# Patient Record
Sex: MALE | Race: WHITE | NOT HISPANIC OR LATINO | Employment: OTHER | URBAN - NONMETROPOLITAN AREA
[De-identification: names, ages, dates, MRNs, and addresses within clinical notes are randomized per-mention and may not be internally consistent; named-entity substitution may affect disease eponyms.]

---

## 2020-11-24 ENCOUNTER — LAB (OUTPATIENT)
Dept: LAB | Facility: HOSPITAL | Age: 60
End: 2020-11-24

## 2020-11-24 ENCOUNTER — OFFICE VISIT (OUTPATIENT)
Dept: INTERNAL MEDICINE | Facility: CLINIC | Age: 60
End: 2020-11-24

## 2020-11-24 VITALS
SYSTOLIC BLOOD PRESSURE: 132 MMHG | WEIGHT: 177 LBS | HEART RATE: 59 BPM | DIASTOLIC BLOOD PRESSURE: 88 MMHG | TEMPERATURE: 98 F | OXYGEN SATURATION: 96 %

## 2020-11-24 DIAGNOSIS — Z99.89 OSA ON CPAP: ICD-10-CM

## 2020-11-24 DIAGNOSIS — Z00.00 ENCOUNTER FOR PREVENTIVE CARE: ICD-10-CM

## 2020-11-24 DIAGNOSIS — I10 ESSENTIAL HYPERTENSION: ICD-10-CM

## 2020-11-24 DIAGNOSIS — Z00.00 ENCOUNTER FOR PREVENTIVE CARE: Primary | ICD-10-CM

## 2020-11-24 DIAGNOSIS — Z11.59 ENCOUNTER FOR HEPATITIS C SCREENING TEST FOR LOW RISK PATIENT: ICD-10-CM

## 2020-11-24 DIAGNOSIS — M19.032 ARTHRITIS OF LEFT WRIST: ICD-10-CM

## 2020-11-24 DIAGNOSIS — Z12.5 PROSTATE CANCER SCREENING: ICD-10-CM

## 2020-11-24 DIAGNOSIS — N13.8 BPH WITH OBSTRUCTION/LOWER URINARY TRACT SYMPTOMS: ICD-10-CM

## 2020-11-24 DIAGNOSIS — G47.33 OSA ON CPAP: ICD-10-CM

## 2020-11-24 DIAGNOSIS — E78.5 HYPERLIPIDEMIA, UNSPECIFIED HYPERLIPIDEMIA TYPE: ICD-10-CM

## 2020-11-24 DIAGNOSIS — Z13.31 DEPRESSION SCREEN: ICD-10-CM

## 2020-11-24 DIAGNOSIS — N40.1 BPH WITH OBSTRUCTION/LOWER URINARY TRACT SYMPTOMS: ICD-10-CM

## 2020-11-24 LAB
25(OH)D3 SERPL-MCNC: 35.4 NG/ML (ref 30–100)
ALBUMIN SERPL-MCNC: 4.8 G/DL (ref 3.5–5.2)
ALBUMIN/GLOB SERPL: 2 G/DL
ALP SERPL-CCNC: 53 U/L (ref 39–117)
ALT SERPL W P-5'-P-CCNC: 17 U/L (ref 1–41)
ANION GAP SERPL CALCULATED.3IONS-SCNC: 11.6 MMOL/L (ref 5–15)
AST SERPL-CCNC: 20 U/L (ref 1–40)
BASOPHILS # BLD AUTO: 0.05 10*3/MM3 (ref 0–0.2)
BASOPHILS NFR BLD AUTO: 1.1 % (ref 0–1.5)
BILIRUB SERPL-MCNC: 0.5 MG/DL (ref 0–1.2)
BUN SERPL-MCNC: 11 MG/DL (ref 8–23)
BUN/CREAT SERPL: 11.1 (ref 7–25)
CALCIUM SPEC-SCNC: 9.8 MG/DL (ref 8.6–10.5)
CHLORIDE SERPL-SCNC: 101 MMOL/L (ref 98–107)
CHOLEST SERPL-MCNC: 141 MG/DL (ref 0–200)
CO2 SERPL-SCNC: 30.4 MMOL/L (ref 22–29)
CREAT SERPL-MCNC: 0.99 MG/DL (ref 0.76–1.27)
DEPRECATED RDW RBC AUTO: 39.8 FL (ref 37–54)
EOSINOPHIL # BLD AUTO: 0.17 10*3/MM3 (ref 0–0.4)
EOSINOPHIL NFR BLD AUTO: 3.7 % (ref 0.3–6.2)
ERYTHROCYTE [DISTWIDTH] IN BLOOD BY AUTOMATED COUNT: 11.7 % (ref 12.3–15.4)
GFR SERPL CREATININE-BSD FRML MDRD: 77 ML/MIN/1.73
GLOBULIN UR ELPH-MCNC: 2.4 GM/DL
GLUCOSE SERPL-MCNC: 95 MG/DL (ref 65–99)
HBA1C MFR BLD: 4.9 % (ref 4.8–5.6)
HCT VFR BLD AUTO: 44.4 % (ref 37.5–51)
HCV AB SER DONR QL: NORMAL
HDLC SERPL-MCNC: 58 MG/DL (ref 40–60)
HGB BLD-MCNC: 15.2 G/DL (ref 13–17.7)
IMM GRANULOCYTES # BLD AUTO: 0.02 10*3/MM3 (ref 0–0.05)
IMM GRANULOCYTES NFR BLD AUTO: 0.4 % (ref 0–0.5)
LDLC SERPL CALC-MCNC: 67 MG/DL (ref 0–100)
LDLC/HDLC SERPL: 1.14 {RATIO}
LYMPHOCYTES # BLD AUTO: 1.25 10*3/MM3 (ref 0.7–3.1)
LYMPHOCYTES NFR BLD AUTO: 27.4 % (ref 19.6–45.3)
MCH RBC QN AUTO: 31.9 PG (ref 26.6–33)
MCHC RBC AUTO-ENTMCNC: 34.2 G/DL (ref 31.5–35.7)
MCV RBC AUTO: 93.3 FL (ref 79–97)
MONOCYTES # BLD AUTO: 0.51 10*3/MM3 (ref 0.1–0.9)
MONOCYTES NFR BLD AUTO: 11.2 % (ref 5–12)
NEUTROPHILS NFR BLD AUTO: 2.56 10*3/MM3 (ref 1.7–7)
NEUTROPHILS NFR BLD AUTO: 56.2 % (ref 42.7–76)
NRBC BLD AUTO-RTO: 0 /100 WBC (ref 0–0.2)
PLATELET # BLD AUTO: 199 10*3/MM3 (ref 140–450)
PMV BLD AUTO: 10.9 FL (ref 6–12)
POTASSIUM SERPL-SCNC: 4 MMOL/L (ref 3.5–5.2)
PROT SERPL-MCNC: 7.2 G/DL (ref 6–8.5)
PSA SERPL-MCNC: 1.11 NG/ML (ref 0–4)
RBC # BLD AUTO: 4.76 10*6/MM3 (ref 4.14–5.8)
SODIUM SERPL-SCNC: 143 MMOL/L (ref 136–145)
TRIGL SERPL-MCNC: 83 MG/DL (ref 0–150)
VLDLC SERPL-MCNC: 16 MG/DL (ref 5–40)
WBC # BLD AUTO: 4.56 10*3/MM3 (ref 3.4–10.8)

## 2020-11-24 PROCEDURE — 86803 HEPATITIS C AB TEST: CPT

## 2020-11-24 PROCEDURE — 80061 LIPID PANEL: CPT

## 2020-11-24 PROCEDURE — 99386 PREV VISIT NEW AGE 40-64: CPT | Performed by: INTERNAL MEDICINE

## 2020-11-24 PROCEDURE — G0103 PSA SCREENING: HCPCS

## 2020-11-24 PROCEDURE — 36415 COLL VENOUS BLD VENIPUNCTURE: CPT

## 2020-11-24 PROCEDURE — 80053 COMPREHEN METABOLIC PANEL: CPT

## 2020-11-24 PROCEDURE — 82306 VITAMIN D 25 HYDROXY: CPT

## 2020-11-24 PROCEDURE — 83036 HEMOGLOBIN GLYCOSYLATED A1C: CPT

## 2020-11-24 PROCEDURE — 85025 COMPLETE CBC W/AUTO DIFF WBC: CPT

## 2020-11-24 RX ORDER — HYDROCHLOROTHIAZIDE 25 MG/1
25 TABLET ORAL DAILY
Qty: 90 TABLET | Refills: 2 | Status: SHIPPED | OUTPATIENT
Start: 2020-11-24 | End: 2021-12-17

## 2020-11-24 RX ORDER — SIMVASTATIN 20 MG
20 TABLET ORAL NIGHTLY
Qty: 90 TABLET | Refills: 2 | Status: SHIPPED | OUTPATIENT
Start: 2020-11-24 | End: 2021-09-20

## 2020-11-24 RX ORDER — TAMSULOSIN HYDROCHLORIDE 0.4 MG/1
2 CAPSULE ORAL DAILY
COMMUNITY
End: 2021-07-15

## 2020-11-24 RX ORDER — CETIRIZINE HYDROCHLORIDE 10 MG/1
10 TABLET ORAL DAILY
COMMUNITY

## 2020-11-24 RX ORDER — SIMVASTATIN 20 MG
20 TABLET ORAL NIGHTLY
COMMUNITY
End: 2020-11-24 | Stop reason: SDUPTHER

## 2020-11-24 RX ORDER — HYDROCHLOROTHIAZIDE 25 MG/1
25 TABLET ORAL DAILY
COMMUNITY
End: 2020-11-24 | Stop reason: SDUPTHER

## 2020-12-01 ENCOUNTER — LAB REQUISITION (OUTPATIENT)
Dept: LAB | Facility: HOSPITAL | Age: 60
End: 2020-12-01

## 2020-12-01 DIAGNOSIS — Z00.00 ROUTINE GENERAL MEDICAL EXAMINATION AT A HEALTH CARE FACILITY: ICD-10-CM

## 2020-12-01 PROCEDURE — 87635 SARS-COV-2 COVID-19 AMP PRB: CPT

## 2020-12-02 LAB — SARS-COV-2 N GENE RESP QL NAA+PROBE: NOT DETECTED

## 2021-01-08 RX ORDER — TAMSULOSIN HYDROCHLORIDE 0.4 MG/1
0.4 CAPSULE ORAL DAILY
COMMUNITY

## 2021-01-08 RX ORDER — HYDROCHLOROTHIAZIDE 25 MG/1
25 TABLET ORAL DAILY
COMMUNITY

## 2021-01-08 RX ORDER — SIMVASTATIN 20 MG
20 TABLET ORAL NIGHTLY
COMMUNITY

## 2021-01-10 ENCOUNTER — OFFICE VISIT (OUTPATIENT)
Age: 61
End: 2021-01-10

## 2021-01-10 VITALS — HEART RATE: 77 BPM | OXYGEN SATURATION: 97 % | TEMPERATURE: 98 F

## 2021-01-10 DIAGNOSIS — Z11.59 SCREENING FOR VIRAL DISEASE: Primary | ICD-10-CM

## 2021-01-10 PROCEDURE — 99999 PR OFFICE/OUTPT VISIT,PROCEDURE ONLY: CPT | Performed by: NURSE PRACTITIONER

## 2021-01-11 ENCOUNTER — HOSPITAL ENCOUNTER (OUTPATIENT)
Dept: NON INVASIVE DIAGNOSTICS | Age: 61
Discharge: HOME | End: 2021-01-11
Payer: COMMERCIAL

## 2021-01-11 DIAGNOSIS — Z01.818 PRE-OP TESTING: ICD-10-CM

## 2021-01-11 LAB
EKG P AXIS: 50 DEGREES
EKG P-R INTERVAL: 170 MS
EKG Q-T INTERVAL: 400 MS
EKG QRS DURATION: 86 MS
EKG QTC CALCULATION (BAZETT): 405 MS
EKG T AXIS: 20 DEGREES
SARS-COV-2, PCR: NOT DETECTED

## 2021-01-12 ENCOUNTER — ANESTHESIA EVENT (OUTPATIENT)
Dept: OPERATING ROOM | Age: 61
End: 2021-01-12

## 2021-01-14 ENCOUNTER — ANESTHESIA (OUTPATIENT)
Dept: OPERATING ROOM | Age: 61
End: 2021-01-14

## 2021-01-14 ENCOUNTER — HOSPITAL ENCOUNTER (OUTPATIENT)
Age: 61
Setting detail: OUTPATIENT SURGERY
Discharge: HOME | End: 2021-01-14
Attending: ORTHOPAEDIC SURGERY | Admitting: ORTHOPAEDIC SURGERY

## 2021-01-14 VITALS
SYSTOLIC BLOOD PRESSURE: 135 MMHG | TEMPERATURE: 97 F | OXYGEN SATURATION: 97 % | RESPIRATION RATE: 12 BRPM | WEIGHT: 177 LBS | HEART RATE: 79 BPM | DIASTOLIC BLOOD PRESSURE: 70 MMHG | HEIGHT: 67 IN | BODY MASS INDEX: 27.78 KG/M2

## 2021-01-14 VITALS
SYSTOLIC BLOOD PRESSURE: 101 MMHG | DIASTOLIC BLOOD PRESSURE: 66 MMHG | RESPIRATION RATE: 4 BRPM | OXYGEN SATURATION: 97 %

## 2021-01-14 DIAGNOSIS — Z01.818 PRE-OP TESTING: Primary | ICD-10-CM

## 2021-01-14 PROBLEM — G56.22 CUBITAL TUNNEL SYNDROME ON LEFT: Status: ACTIVE | Noted: 2021-01-14

## 2021-01-14 PROCEDURE — G8907 PT DOC NO EVENTS ON DISCHARG: HCPCS

## 2021-01-14 PROCEDURE — G8916 PT W IV AB GIVEN ON TIME: HCPCS

## 2021-01-14 PROCEDURE — 64718 REVISE ULNAR NERVE AT ELBOW: CPT

## 2021-01-14 RX ORDER — HYDROMORPHONE HCL 110MG/55ML
0.5 PATIENT CONTROLLED ANALGESIA SYRINGE INTRAVENOUS EVERY 5 MIN PRN
Status: DISCONTINUED | OUTPATIENT
Start: 2021-01-14 | End: 2021-01-14 | Stop reason: HOSPADM

## 2021-01-14 RX ORDER — MIDAZOLAM HYDROCHLORIDE 1 MG/ML
INJECTION INTRAMUSCULAR; INTRAVENOUS PRN
Status: DISCONTINUED | OUTPATIENT
Start: 2021-01-14 | End: 2021-01-14 | Stop reason: SDUPTHER

## 2021-01-14 RX ORDER — FENTANYL CITRATE 50 UG/ML
INJECTION, SOLUTION INTRAMUSCULAR; INTRAVENOUS PRN
Status: DISCONTINUED | OUTPATIENT
Start: 2021-01-14 | End: 2021-01-14 | Stop reason: SDUPTHER

## 2021-01-14 RX ORDER — MEPERIDINE HYDROCHLORIDE 25 MG/ML
12.5 INJECTION INTRAMUSCULAR; INTRAVENOUS; SUBCUTANEOUS EVERY 5 MIN PRN
Status: DISCONTINUED | OUTPATIENT
Start: 2021-01-14 | End: 2021-01-14 | Stop reason: HOSPADM

## 2021-01-14 RX ORDER — OXYCODONE HYDROCHLORIDE AND ACETAMINOPHEN 5; 325 MG/1; MG/1
2 TABLET ORAL PRN
Status: COMPLETED | OUTPATIENT
Start: 2021-01-14 | End: 2021-01-14

## 2021-01-14 RX ORDER — FENTANYL CITRATE 50 UG/ML
50 INJECTION, SOLUTION INTRAMUSCULAR; INTRAVENOUS EVERY 5 MIN PRN
Status: DISCONTINUED | OUTPATIENT
Start: 2021-01-14 | End: 2021-01-14 | Stop reason: HOSPADM

## 2021-01-14 RX ORDER — OXYCODONE HYDROCHLORIDE AND ACETAMINOPHEN 5; 325 MG/1; MG/1
1 TABLET ORAL PRN
Status: COMPLETED | OUTPATIENT
Start: 2021-01-14 | End: 2021-01-14

## 2021-01-14 RX ORDER — SODIUM CHLORIDE, SODIUM LACTATE, POTASSIUM CHLORIDE, CALCIUM CHLORIDE 600; 310; 30; 20 MG/100ML; MG/100ML; MG/100ML; MG/100ML
INJECTION, SOLUTION INTRAVENOUS CONTINUOUS
Status: DISCONTINUED | OUTPATIENT
Start: 2021-01-14 | End: 2021-01-14 | Stop reason: HOSPADM

## 2021-01-14 RX ORDER — LIDOCAINE HYDROCHLORIDE AND EPINEPHRINE 10; 10 MG/ML; UG/ML
INJECTION, SOLUTION INFILTRATION; PERINEURAL PRN
Status: DISCONTINUED | OUTPATIENT
Start: 2021-01-14 | End: 2021-01-14 | Stop reason: ALTCHOICE

## 2021-01-14 RX ORDER — HYDROMORPHONE HCL 110MG/55ML
0.25 PATIENT CONTROLLED ANALGESIA SYRINGE INTRAVENOUS EVERY 5 MIN PRN
Status: DISCONTINUED | OUTPATIENT
Start: 2021-01-14 | End: 2021-01-14 | Stop reason: HOSPADM

## 2021-01-14 RX ORDER — PROMETHAZINE HYDROCHLORIDE 25 MG/ML
12.5 INJECTION, SOLUTION INTRAMUSCULAR; INTRAVENOUS
Status: DISCONTINUED | OUTPATIENT
Start: 2021-01-14 | End: 2021-01-14 | Stop reason: HOSPADM

## 2021-01-14 RX ORDER — DIPHENHYDRAMINE HYDROCHLORIDE 50 MG/ML
12.5 INJECTION INTRAMUSCULAR; INTRAVENOUS
Status: DISCONTINUED | OUTPATIENT
Start: 2021-01-14 | End: 2021-01-14 | Stop reason: HOSPADM

## 2021-01-14 RX ORDER — ONDANSETRON 2 MG/ML
4 INJECTION INTRAMUSCULAR; INTRAVENOUS
Status: DISCONTINUED | OUTPATIENT
Start: 2021-01-14 | End: 2021-01-14 | Stop reason: HOSPADM

## 2021-01-14 RX ORDER — SODIUM CHLORIDE, SODIUM LACTATE, POTASSIUM CHLORIDE, CALCIUM CHLORIDE 600; 310; 30; 20 MG/100ML; MG/100ML; MG/100ML; MG/100ML
INJECTION, SOLUTION INTRAVENOUS CONTINUOUS PRN
Status: DISCONTINUED | OUTPATIENT
Start: 2021-01-14 | End: 2021-01-14 | Stop reason: SDUPTHER

## 2021-01-14 RX ORDER — LIDOCAINE HYDROCHLORIDE 10 MG/ML
1 INJECTION, SOLUTION EPIDURAL; INFILTRATION; INTRACAUDAL; PERINEURAL
Status: DISCONTINUED | OUTPATIENT
Start: 2021-01-14 | End: 2021-01-14 | Stop reason: HOSPADM

## 2021-01-14 RX ORDER — HYDRALAZINE HYDROCHLORIDE 20 MG/ML
5 INJECTION INTRAMUSCULAR; INTRAVENOUS EVERY 10 MIN PRN
Status: DISCONTINUED | OUTPATIENT
Start: 2021-01-14 | End: 2021-01-14 | Stop reason: HOSPADM

## 2021-01-14 RX ORDER — ONDANSETRON 2 MG/ML
INJECTION INTRAMUSCULAR; INTRAVENOUS PRN
Status: DISCONTINUED | OUTPATIENT
Start: 2021-01-14 | End: 2021-01-14 | Stop reason: SDUPTHER

## 2021-01-14 RX ORDER — LIDOCAINE HYDROCHLORIDE 10 MG/ML
INJECTION, SOLUTION INFILTRATION; PERINEURAL PRN
Status: DISCONTINUED | OUTPATIENT
Start: 2021-01-14 | End: 2021-01-14 | Stop reason: SDUPTHER

## 2021-01-14 RX ORDER — LABETALOL HYDROCHLORIDE 5 MG/ML
5 INJECTION, SOLUTION INTRAVENOUS EVERY 10 MIN PRN
Status: DISCONTINUED | OUTPATIENT
Start: 2021-01-14 | End: 2021-01-14 | Stop reason: HOSPADM

## 2021-01-14 RX ORDER — KETOROLAC TROMETHAMINE 30 MG/ML
INJECTION, SOLUTION INTRAMUSCULAR; INTRAVENOUS PRN
Status: DISCONTINUED | OUTPATIENT
Start: 2021-01-14 | End: 2021-01-14 | Stop reason: SDUPTHER

## 2021-01-14 RX ORDER — CETIRIZINE HYDROCHLORIDE 10 MG/1
10 TABLET ORAL DAILY
COMMUNITY

## 2021-01-14 RX ORDER — PROPOFOL 10 MG/ML
INJECTION, EMULSION INTRAVENOUS PRN
Status: DISCONTINUED | OUTPATIENT
Start: 2021-01-14 | End: 2021-01-14 | Stop reason: SDUPTHER

## 2021-01-14 RX ORDER — DEXAMETHASONE SODIUM PHOSPHATE 4 MG/ML
INJECTION, SOLUTION INTRA-ARTICULAR; INTRALESIONAL; INTRAMUSCULAR; INTRAVENOUS; SOFT TISSUE PRN
Status: DISCONTINUED | OUTPATIENT
Start: 2021-01-14 | End: 2021-01-14 | Stop reason: SDUPTHER

## 2021-01-14 RX ORDER — ONDANSETRON 4 MG/1
8 TABLET, FILM COATED ORAL
Status: CANCELLED | OUTPATIENT
Start: 2021-01-14 | End: 2021-01-14

## 2021-01-14 RX ADMIN — FENTANYL CITRATE 50 MCG: 50 INJECTION, SOLUTION INTRAMUSCULAR; INTRAVENOUS at 08:47

## 2021-01-14 RX ADMIN — KETOROLAC TROMETHAMINE 30 MG: 30 INJECTION, SOLUTION INTRAMUSCULAR; INTRAVENOUS at 09:53

## 2021-01-14 RX ADMIN — SODIUM CHLORIDE, SODIUM LACTATE, POTASSIUM CHLORIDE, CALCIUM CHLORIDE: 600; 310; 30; 20 INJECTION, SOLUTION INTRAVENOUS at 08:12

## 2021-01-14 RX ADMIN — OXYCODONE HYDROCHLORIDE AND ACETAMINOPHEN 1 TABLET: 5; 325 TABLET ORAL at 10:27

## 2021-01-14 RX ADMIN — PROPOFOL 100 MG: 10 INJECTION, EMULSION INTRAVENOUS at 08:50

## 2021-01-14 RX ADMIN — MIDAZOLAM HYDROCHLORIDE 1 MG: 1 INJECTION INTRAMUSCULAR; INTRAVENOUS at 08:47

## 2021-01-14 RX ADMIN — PROPOFOL 80 MG: 10 INJECTION, EMULSION INTRAVENOUS at 08:51

## 2021-01-14 RX ADMIN — KETOROLAC TROMETHAMINE 30 MG: 30 INJECTION, SOLUTION INTRAMUSCULAR; INTRAVENOUS at 09:54

## 2021-01-14 RX ADMIN — ONDANSETRON 4 MG: 2 INJECTION INTRAMUSCULAR; INTRAVENOUS at 09:12

## 2021-01-14 RX ADMIN — LIDOCAINE HYDROCHLORIDE 30 MG: 10 INJECTION, SOLUTION INFILTRATION; PERINEURAL at 08:50

## 2021-01-14 RX ADMIN — SODIUM CHLORIDE, SODIUM LACTATE, POTASSIUM CHLORIDE, CALCIUM CHLORIDE: 600; 310; 30; 20 INJECTION, SOLUTION INTRAVENOUS at 08:11

## 2021-01-14 RX ADMIN — DEXAMETHASONE SODIUM PHOSPHATE 4 MG: 4 INJECTION, SOLUTION INTRA-ARTICULAR; INTRALESIONAL; INTRAMUSCULAR; INTRAVENOUS; SOFT TISSUE at 09:12

## 2021-01-14 ASSESSMENT — PAIN - FUNCTIONAL ASSESSMENT: PAIN_FUNCTIONAL_ASSESSMENT: 0-10

## 2021-01-14 NOTE — OP NOTE
Patient Name: Duy Heck  MRN: [id number]  : 1960      [address] [place] [address] Road of SURGERY: 2021    SURGEON: Eunice Fritz MD    ASSISTANT: NONE     PREOPERATIVE DIAGNOSIS:  Left Cubital Tunnel Syndrome    POSTOPERATIVE DIAGNOSIS  Left cubital tunnel syndrome. PROCEDURE PERFORMED  Left cubital tunnel release. IMPLANTS  None. ANESTHESIA USED  Laryngeal mask airway with local.    PREOPERATIVE INDICATIONS  Patient is a 63 y.o. male who presented clinically with complaints of numbness and tingling in the ulnar nerve distribution. The patient had nerve conduction velocity study which showed cubital tunnel syndrome. Having failed conservative treatment the patient wished to proceed with surgery understanding the risks, benefits, and alternatives. The risks include but are not limited to that of anesthesia, bleeding, infection, pain, damage to local structures, need for further surgery. We did discuss the possibility of having to anteriorly transpose the nerve if there was subluxation present. I also discussed damage to the ulnar nerve itself including the first branch to the flexor carpi ulnaris muscle. ESTIMATED BLOOD LOSS    10 mL     SPECIMENS  None. DRAINS  None. COMPLICATIONS  None. PROCEDURE IN DETAIL  The patient was seen in the preoperative holding room. Once again the informed consent was reviewed with the patient. The site of surgery was marked with the patient's agreement. The patient was transported to the operating room where time out was performed identifying the correct patient and the operative site. 2 grams of IV Kefzol were given as preoperative antibiotics. General anesthesia was then induced. A nonsterile tourniquet was placed high on the left brachium. The left upper extremity was then prepped and draped in a normal sterile fashion. A skin marker was used to delineate an approximately 8 cm incision centered over the posterior aspect of the left elbow medial epicondyle. The left upper extremity was then exsanguinated with an Esmarch and the tourniquet was inflated to 250 mmHg for less than 30 minutes. An incision was made just along the medial aspect of the elbow adjacent to the cubital tunnel along the medial epicondyle. Soft tissue was dissected down to the level of the medial epicondyle. Full-thickness flaps were elevated. Branches of the medial antebrachial cutaneous nerve were protected. The FCU muscular fascia was then sharply incised with a 15 blade scalpel and the ulnar nerve was identified. The roof of the cubital tunnel was then released with a [place] blade while protecting the underlying nerve. The nerve was then freed from all points of compression working distally from the flexor pronator fascia proximally to the medial intermuscular septum/arcade of [place]. The nerve was completely freed identifying small branches to the FCU muscle as well. After a complete release of the nerve was performed, the elbow was taken through a range of motion showing no subluxation of the nerve. The tourniquet was then deflated and hemostasis was obtained with bipolar electrocautery. The incision site was thoroughly irrigated followed by closure in layers. The skin was closed with horizontal mattress 4-0 nylon sutures. For postoperative pain control, approximately 10 cc of 1% lidocaine with epinephrine were injected in line with the incision. A soft dressing was placed about the left elbow. All counts at the end the procedure were correct. The patient was awakened from anesthesia, transported to the recovery room in stable condition. DISCHARGE PLAN  Discharge home with family. Follow up in 2 weeks for clinical check.

## 2021-01-14 NOTE — ANESTHESIA POSTPROCEDURE EVALUATION
Department of Anesthesiology  Postprocedure Note    Patient: Socorro Mcdonnell  MRN: [id number]  [place]: 1960  Date of evaluation: 1/14/2021  Time:  10:05 AM     Procedure Summary     Date: 01/14/21 Room / Location: [facility] OR [address] [place] [address]    Anesthesia Start: 0897 Anesthesia Stop: 1003    Procedure: LEFT INSITU CUBITAL TUNNEL DECOMPRESSION (Left Wrist) Diagnosis: (X05.08)    Surgeons: Pérez Casanova MD Responsible Provider: DARLINE Briseno CRNA    Anesthesia Type: general ASA Status: 2          Anesthesia Type: general    Cecilia Phase I:      Cecilia Phase II:      Last vitals: Reviewed and per EMR flowsheets.        Anesthesia Post Evaluation    Patient location during evaluation: bedside  Patient participation: complete - patient participated  Level of consciousness: sleepy but conscious  Airway patency: patent  Nausea & Vomiting: no nausea and no vomiting  Complications: no  Cardiovascular status: blood pressure returned to baseline  Respiratory status: acceptable, room air and spontaneous ventilation  Hydration status: euvolemic

## 2021-01-14 NOTE — ANESTHESIA PRE PROCEDURE
BP: (!) 171/90    Pulse: 73    Resp: 20    Temp: 97 °F (36.1 °C)    TempSrc: Tympanic    Weight: 176 lb (79.8 kg) 177 lb (80.3 kg)   Height: 5' 7\" (1.702 m)                                               BP Readings from Last 3 Encounters:   01/14/21 (!) 171/90       NPO Status: Time of last liquid consumption: 2130                        Time of last solid consumption: 1700                        Date of last liquid consumption: 01/13/21                        Date of last solid food consumption: 01/13/21    BMI:   Wt Readings from Last 3 Encounters:   01/14/21 177 lb (80.3 kg)     Body mass index is 27.72 kg/m². CBC: No results found for: WBC, RBC, HGB, HCT, MCV, RDW, PLT    CMP: No results found for: NA, K, CL, CO2, BUN, CREATININE, GFRAA, AGRATIO, LABGLOM, GLUCOSE, PROT, CALCIUM, BILITOT, ALKPHOS, AST, ALT    POC Tests: No results for input(s): POCGLU, POCNA, POCK, POCCL, POCBUN, POCHEMO, POCHCT in the last 72 hours.     Coags: No results found for: PROTIME, INR, APTT    HCG (If Applicable): No results found for: PREGTESTUR, PREGSERUM, HCG, HCGQUANT     ABGs: No results found for: PHART, PO2ART, ILP0HCJ, NYY1CZA, BEART, K7FVIJJA     Type & Screen (If Applicable):  No results found for: LABABO, LABRH    Drug/Infectious Status (If Applicable):  No results found for: HIV, HEPCAB    COVID-19 Screening (If Applicable):   Lab Results   Component Value Date    COVID19 Not Detected 01/10/2021         Anesthesia Evaluation  Patient summary reviewed and Nursing notes reviewed  Airway: Mallampati: II  TM distance: >3 FB   Neck ROM: full  Mouth opening: > = 3 FB Dental:          Pulmonary:normal exam    (+) sleep apnea:                             Cardiovascular:    (+) hypertension:,          Beta Blocker:  Not on Beta Blocker         Neuro/Psych:   (+) neuromuscular disease:,             GI/Hepatic/Renal: Neg GI/Hepatic/Renal ROS            Endo/Other: Negative Endo/Other ROS                    Abdominal: Vascular: negative vascular ROS. Anesthesia Plan      general     ASA 2       Induction: intravenous. MIPS: Postoperative opioids intended and Prophylactic antiemetics administered. Anesthetic plan and risks discussed with patient and spouse. Plan discussed with CRNA.                   DARLINE Jay - BLAYNE   1/14/2021

## 2021-06-02 ENCOUNTER — OFFICE VISIT (OUTPATIENT)
Dept: INTERNAL MEDICINE | Facility: CLINIC | Age: 61
End: 2021-06-02

## 2021-06-02 VITALS
HEIGHT: 67 IN | DIASTOLIC BLOOD PRESSURE: 68 MMHG | OXYGEN SATURATION: 96 % | HEART RATE: 50 BPM | SYSTOLIC BLOOD PRESSURE: 128 MMHG | WEIGHT: 177.8 LBS | BODY MASS INDEX: 27.91 KG/M2

## 2021-06-02 DIAGNOSIS — Z12.5 PROSTATE CANCER SCREENING: ICD-10-CM

## 2021-06-02 DIAGNOSIS — G47.33 OSA ON CPAP: ICD-10-CM

## 2021-06-02 DIAGNOSIS — Z00.00 HEALTHCARE MAINTENANCE: ICD-10-CM

## 2021-06-02 DIAGNOSIS — Z99.89 OSA ON CPAP: ICD-10-CM

## 2021-06-02 DIAGNOSIS — I10 ESSENTIAL HYPERTENSION: Primary | ICD-10-CM

## 2021-06-02 DIAGNOSIS — E78.5 HYPERLIPIDEMIA, UNSPECIFIED HYPERLIPIDEMIA TYPE: ICD-10-CM

## 2021-06-02 PROCEDURE — 99214 OFFICE O/P EST MOD 30 MIN: CPT | Performed by: INTERNAL MEDICINE

## 2021-06-02 RX ORDER — ATOVAQUONE AND PROGUANIL HYDROCHLORIDE 250; 100 MG/1; MG/1
1 TABLET, FILM COATED ORAL
Qty: 40 TABLET | Refills: 1 | Status: SHIPPED | OUTPATIENT
Start: 2021-06-02 | End: 2021-11-15 | Stop reason: SDUPTHER

## 2021-06-02 NOTE — PROGRESS NOTES
Chief Complaint   Patient presents with   • Follow-up   • Hypertension     Answers for HPI/ROS submitted by the patient on 5/26/2021  What is the primary reason for your visit?: Other  Please describe your symptoms.: None.  Just a follow up to my annual physical.  Have you had these symptoms before?: No  How long have you been having these symptoms?: 1-4 days    History:  Bruce Mistry is a 61 y.o. male who presents today for evaluation of the above problems.    Presents today for 6-month follow-up on hypertension and hyperlipidemia.  He denies any new issues or complaints.    He will be going to [facility] for 30 days on July 17.  His previous doctor, Dr. JANINE Gardner prescribe him atovaquone proguanil 250-100.  He would take the medication 2 days prior to leaving, daily while there and 7 days after he returned.  He does report having all of his vaccines.    He had left ulnar nerve surgery in January and has done very well.  He is now able to play golf.  He still has arthritis pain in the wrist but is pleased that he can play golf now.    He has not had the COVID-19 vaccine was not planning on getting 1.        HPI    ROS:  Review of Systems   Constitutional: Negative for activity change and fatigue.   Respiratory: Negative for cough and shortness of breath.    Cardiovascular: Negative for chest pain, palpitations and leg swelling.   Musculoskeletal: Positive for arthralgias.         Current Outpatient Medications:   •  cetirizine (zyrTEC) 10 MG tablet, Take 10 mg by mouth Daily. Taking 1 daily, Disp: , Rfl:   •  hydroCHLOROthiazide (HYDRODIURIL) 25 MG tablet, Take 1 tablet by mouth Daily. Taking 1 daily, Disp: 90 tablet, Rfl: 2  •  simvastatin (ZOCOR) 20 MG tablet, Take 1 tablet by mouth Every Night. Taking 1 daily, Disp: 90 tablet, Rfl: 2  •  tamsulosin (FLOMAX) 0.4 MG capsule 24 hr capsule, Take 2 capsules by mouth Daily. Taking 2 daily, Disp: , Rfl:   •  atovaquone-proguanil (MALARONE) 250-100 MG tablet per tablet,  Take 1 tablet by mouth Daily. Start 2 days before travel, daily while there, and continue for 7 days after returning, Disp: 40 tablet, Rfl: 1    Lab Results   Component Value Date    GLUCOSE 95 11/24/2020    BUN 11 11/24/2020    CREATININE 0.99 11/24/2020    EGFRIFNONA 77 11/24/2020    BCR 11.1 11/24/2020    K 4.0 11/24/2020    CO2 30.4 (H) 11/24/2020    CALCIUM 9.8 11/24/2020    ALBUMIN 4.80 11/24/2020    AST 20 11/24/2020    ALT 17 11/24/2020       WBC   Date Value Ref Range Status   11/24/2020 4.56 3.40 - 10.80 10*3/mm3 Final     RBC   Date Value Ref Range Status   11/24/2020 4.76 4.14 - 5.80 10*6/mm3 Final     Hemoglobin   Date Value Ref Range Status   11/24/2020 15.2 13.0 - 17.7 g/dL Final     Hematocrit   Date Value Ref Range Status   11/24/2020 44.4 37.5 - 51.0 % Final     MCV   Date Value Ref Range Status   11/24/2020 93.3 79.0 - 97.0 fL Final     MCH   Date Value Ref Range Status   11/24/2020 31.9 26.6 - 33.0 pg Final     MCHC   Date Value Ref Range Status   11/24/2020 34.2 31.5 - 35.7 g/dL Final     RDW   Date Value Ref Range Status   11/24/2020 11.7 (L) 12.3 - 15.4 % Final     RDW-SD   Date Value Ref Range Status   11/24/2020 39.8 37.0 - 54.0 fl Final     MPV   Date Value Ref Range Status   11/24/2020 10.9 6.0 - 12.0 fL Final     Platelets   Date Value Ref Range Status   11/24/2020 199 140 - 450 10*3/mm3 Final     Neutrophil %   Date Value Ref Range Status   11/24/2020 56.2 42.7 - 76.0 % Final     Lymphocyte %   Date Value Ref Range Status   11/24/2020 27.4 19.6 - 45.3 % Final     Monocyte %   Date Value Ref Range Status   11/24/2020 11.2 5.0 - 12.0 % Final     Eosinophil %   Date Value Ref Range Status   11/24/2020 3.7 0.3 - 6.2 % Final     Basophil %   Date Value Ref Range Status   11/24/2020 1.1 0.0 - 1.5 % Final     Immature Grans %   Date Value Ref Range Status   11/24/2020 0.4 0.0 - 0.5 % Final     Neutrophils, Absolute   Date Value Ref Range Status   11/24/2020 2.56 1.70 - 7.00 10*3/mm3 Final  "    Lymphocytes, Absolute   Date Value Ref Range Status   11/24/2020 1.25 0.70 - 3.10 10*3/mm3 Final     Monocytes, Absolute   Date Value Ref Range Status   11/24/2020 0.51 0.10 - 0.90 10*3/mm3 Final     Eosinophils, Absolute   Date Value Ref Range Status   11/24/2020 0.17 0.00 - 0.40 10*3/mm3 Final     Basophils, Absolute   Date Value Ref Range Status   11/24/2020 0.05 0.00 - 0.20 10*3/mm3 Final     Immature Grans, Absolute   Date Value Ref Range Status   11/24/2020 0.02 0.00 - 0.05 10*3/mm3 Final     nRBC   Date Value Ref Range Status   11/24/2020 0.0 0.0 - 0.2 /100 WBC Final         OBJECTIVE:  Visit Vitals  /68 (BP Location: Right arm, Patient Position: Sitting, Cuff Size: Adult)   Pulse 50   Ht 170.2 cm (67\")   Wt 80.6 kg (177 lb 12.8 oz)   SpO2 96%   BMI 27.85 kg/m²      Physical Exam  Vitals reviewed.   Constitutional:       General: He is not in acute distress.     Appearance: Normal appearance. He is well-developed and normal weight.      Comments: Pleasant   HENT:      Head: Normocephalic and atraumatic.   Eyes:      Pupils: Pupils are equal, round, and reactive to light.   Neck:      Thyroid: No thyromegaly.      Trachea: Phonation normal.   Pulmonary:      Effort: No respiratory distress.   Skin:     Coloration: Skin is not pale.      Findings: No erythema.   Neurological:      Mental Status: He is alert.   Psychiatric:         Behavior: Behavior normal.         Thought Content: Thought content normal.         Judgment: Judgment normal.         Assessment/Plan    Diagnoses and all orders for this visit:    1. Essential hypertension (Primary)    2. Hyperlipidemia, unspecified hyperlipidemia type    3. Healthcare maintenance  -     CBC (No Diff); Future  -     Comprehensive Metabolic Panel; Future  -     Hemoglobin A1c; Future  -     Lipid Panel; Future    4. IRIS on CPAP  -     Ambulatory Referral to Neurology    5. Prostate cancer screening  -     PSA Screen; Future    Other orders  -     " atovaquone-proguanil (MALARONE) 250-100 MG tablet per tablet; Take 1 tablet by mouth Daily. Start 2 days before travel, daily while there, and continue for 7 days after returning  Dispense: 40 tablet; Refill: 1      Blood pressure is well controlled at 128/68.  I would like to continue his hydrochlorothiazide 25 mg daily.    He will be going to [facility] soon.  Refilled his Malarone.  He has received all of his vaccinations already.    He has obstructive sleep apnea and is in need of new machine/supplies.  I am going to send referral to Dr. Jj.    I would like to check the above labs couple days prior to his next visit in 6 months which will be his annual physical.  Follow-up then or sooner if indicated.    Return in about 6 months (around 12/2/2021) for Annual physical.    Patient was given instructions and counseling regarding his/her condition or for health maintenance advice. Please see specific information pulled into the AVS if appropriate.     ALMA ROSA Jj MD   08:05 CDT  6/2/2021

## 2021-06-03 ENCOUNTER — TELEPHONE (OUTPATIENT)
Dept: NEUROLOGY | Age: 61
End: 2021-06-03

## 2021-07-15 RX ORDER — TAMSULOSIN HYDROCHLORIDE 0.4 MG/1
1 CAPSULE ORAL 2 TIMES DAILY
Qty: 180 CAPSULE | Refills: 2 | Status: SHIPPED | OUTPATIENT
Start: 2021-07-15 | End: 2022-04-08

## 2021-08-30 NOTE — PROGRESS NOTES
Chief Complaint   Patient presents with   • Establish Care     physical; pt wants blood work;    • Neck Pain     left pain in neck         History:  Bruce Mistry is a 60 y.o. male who presents today for evaluation of the above problems.      Patient presents today to establish care.  He has a past medical history for hyperlipidemia, hypertension, vitamin D deficiency, obstructive sleep apnea and BPH with lower urinary tract symptoms.  He has brought medical records for review today.    He had pneumococcal vaccine on April 11, 2019, Shingrix on April 11, 2019 and August 12, 2019, Tdap 11/20/2006.    Previous colonoscopy done September 26, 2017 was reviewed.  This will also be scanned into the computer.  Was recommended for 5-year follow-up for recheck on his polyp.      Most recent labs from April 11, 2019 were reviewed showing normal hemoglobin, normal CMP, total cholesterol 191, HDL 43,  and triglycerides 150, vitamin D 35.1, PSA of 3.8 (with the previous March 11, 2019 7.8)    Recently moved back to [place] from [place].  Currently living at his home on [address]    Blood pressure slightly elevated today 132/88 but he reports at home it is very well controlled at 127/77.    He has lost about 30 pounds intentionally over this year.  He wonders if he can come off some of his medications like his blood pressure medicine.  Usually his blood pressure remains 120s systolic and denies any lows or symptoms of low blood pressure    His sleep apnea has gotten better after losing weight and he remains on his CPAP    He has been having a somewhat new issue over the last 1 to 2 months with left-sided neck pain and throbbing worse at night.  It is more of an annoyance.  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  He has a difficult time extending and flexing the wrist due to pain.  Also, recently he  developed a nodule on the medial aspect of the dorsum of his left hand which is tender as well.  He reports previous x-rays and MRIs in [place] previously, he has seen hand specialist in [place] but none since he has been back here in [place]    He has BPH on Flomax 0.8 mg nightly which somewhat control symptoms    Cigar smoker    He declines influenza vaccine          HPI    ROS:  Review of Systems   Constitutional: Negative for activity change, appetite change, fatigue, fever and unexpected weight change.   HENT: Negative for nosebleeds, sore throat and trouble swallowing.    Eyes: Negative for pain and visual disturbance.   Respiratory: Negative for cough, chest tightness and shortness of breath.    Cardiovascular: Negative for chest pain, palpitations and leg swelling.   Gastrointestinal: Negative for abdominal pain, constipation, diarrhea, nausea and vomiting.   Endocrine: Negative for cold intolerance and heat intolerance.   Genitourinary: Negative for hematuria.   Musculoskeletal: Positive for arthralgias and neck pain. Negative for back pain and neck stiffness.   Skin: Negative for rash and wound.   Neurological: Negative for dizziness, syncope, weakness, light-headedness and headaches.   Hematological: Negative for adenopathy. Does not bruise/bleed easily.   Psychiatric/Behavioral: Negative for agitation, behavioral problems, confusion and sleep disturbance. The patient is not nervous/anxious.        No Known Allergies  Past Medical History:   Diagnosis Date   • Arthritis    • Elevated cholesterol    • Hypertension    • Sleep apnea    • Urine troubles      Past Surgical History:   Procedure Laterality Date   • KNEE SURGERY Left 1975   • KNEE SURGERY Right 1981     Family History   Problem Relation Age of Onset   • Cancer Mother    • Other Mother    • Heart disease Father    • Stroke Father      Bruce  reports that he has been smoking cigars. He does not have any smokeless tobacco history on file. He reports  current alcohol use of about 3.0 standard drinks of alcohol per week. He reports that he does not use drugs.    I have reviewed and updated the above documentation (if necessary) including but not limited to chief complaint, ROS, PFSH, allergies and medications        Current Outpatient Medications:   •  cetirizine (zyrTEC) 10 MG tablet, Take 10 mg by mouth Daily. Taking 1 daily, Disp: , Rfl:   •  hydroCHLOROthiazide (HYDRODIURIL) 25 MG tablet, Take 1 tablet by mouth Daily. Taking 1 daily, Disp: 90 tablet, Rfl: 2  •  simvastatin (ZOCOR) 20 MG tablet, Take 1 tablet by mouth Every Night. Taking 1 daily, Disp: 90 tablet, Rfl: 2  •  tamsulosin (FLOMAX) 0.4 MG capsule 24 hr capsule, Take 2 capsules by mouth Daily. Taking 2 daily, Disp: , Rfl:     PHQ-9 Depression Screening  Little interest or pleasure in doing things? 0   Feeling down, depressed, or hopeless? 0   Trouble falling or staying asleep, or sleeping too much?     Feeling tired or having little energy?     Poor appetite or overeating?     Feeling bad about yourself - or that you are a failure or have let yourself or your family down?     Trouble concentrating on things, such as reading the newspaper or watching television?     Moving or speaking so slowly that other people could have noticed? Or the opposite - being so fidgety or restless that you have been moving around a lot more than usual?     Thoughts that you would be better off dead, or of hurting yourself in some way?     PHQ-9 Total Score 0   If you checked off any problems, how difficult have these problems made it for you to do your work, take care of things at home, or get along with other people?       PHQ-9 Total Score: 0    OBJECTIVE:  Visit Vitals  /88 (BP Location: Left arm, Patient Position: Sitting, Cuff Size: Adult)   Pulse 59   Temp 98 °F (36.7 °C) (Temporal)   Wt 80.3 kg (177 lb)   SpO2 96%      Physical Exam  Vitals signs reviewed.   Constitutional:       General: He is not in acute  distress.     Appearance: He is well-developed.      Comments: Pleasant     HENT:      Head: Normocephalic and atraumatic.      Mouth/Throat:      Pharynx: No oropharyngeal exudate.   Eyes:      General: No scleral icterus.     Conjunctiva/sclera: Conjunctivae normal.      Pupils: Pupils are equal, round, and reactive to light.   Neck:      Thyroid: No thyromegaly.      Vascular: No JVD.   Cardiovascular:      Rate and Rhythm: Normal rate and regular rhythm.      Heart sounds: Normal heart sounds. No murmur. No friction rub. No gallop.    Pulmonary:      Effort: Pulmonary effort is normal.      Breath sounds: Normal breath sounds. No stridor. No wheezing or rales.   Abdominal:      General: Bowel sounds are normal. There is no distension.      Palpations: Abdomen is soft.      Tenderness: There is no abdominal tenderness. There is no guarding or rebound.   Musculoskeletal:         General: Tenderness present.      Left wrist: He exhibits decreased range of motion and tenderness.      Cervical back: He exhibits tenderness (mild left trapezius on deep palpation ). He exhibits normal range of motion.      Comments: Decreased range of motion left fifth finger, muscle atrophy in the ulnar nerve distribution and a ganglion cyst on his left fifth MCP   Skin:     General: Skin is warm and dry.   Neurological:      Mental Status: He is alert and oriented to person, place, and time.      Cranial Nerves: No cranial nerve deficit.   Psychiatric:         Mood and Affect: Mood normal.         Behavior: Behavior normal.         Thought Content: Thought content normal.         Judgment: Judgment normal.         [facility]    Assessment/Plan    Diagnoses and all orders for this visit:    1. Encounter for preventive care (Primary)  -     Lipid Panel; Future  -     Hemoglobin A1c; Future  -     Comprehensive Metabolic Panel; Future  -     CBC & Differential; Future  -     Vitamin D 25 Hydroxy; Future    2. Essential hypertension  -      Comprehensive Metabolic Panel; Future  -     hydroCHLOROthiazide (HYDRODIURIL) 25 MG tablet; Take 1 tablet by mouth Daily. Taking 1 daily  Dispense: 90 tablet; Refill: 2    3. Hyperlipidemia, unspecified hyperlipidemia type  -     Lipid Panel; Future  -     Comprehensive Metabolic Panel; Future  -     simvastatin (ZOCOR) 20 MG tablet; Take 1 tablet by mouth Every Night. Taking 1 daily  Dispense: 90 tablet; Refill: 2    4. BPH with obstruction/lower urinary tract symptoms    5. IRIS on CPAP    6. Arthritis of left wrist  -     Vitamin D 25 Hydroxy; Future  -     Cancel: Ambulatory Referral to Orthopedic Surgery  -     Ambulatory Referral to Orthopedic Surgery    7. Depression screen    8. Encounter for hepatitis C screening test for low risk patient  -     Hepatitis C Antibody; Future    9. Prostate cancer screening  -     PSA Screen; Future      Overall, he is doing well.  I like to check labs as above for preventive care including CBC, CMP lipid panel A1c and hepatitis C antibody.  We discussed prostate cancer screening and he would like to have a PSA obtained.  Depression screen was negative with a PHQ 2 of 0.      I am very pleased that he is losing weight and hopefully will be able to back off some of his medications soon.     Given his blood pressure is at goal we will continue with hydrochlorothiazide but discussed hypotensive symptoms and if he develops then we can back off on his hydrochlorothiazide    Continue CPAP for his IRIS.  The symptoms have also improved with weight loss    Likely has a left trapezius muscle strain discussed ibuprofen    He has arthritis of his left wrist with ganglion cyst.  Referring to Dr. Zhang for evaluation and treatment.  It also appears he may have an ulnar nerve neuropathy based on his atrophy and decreased mobility of left little finger    He declines influenza vaccine today    Follow-up 6 months or sooner if clinically indicated    Education materials and an After Visit  Summary were printed and given to the patient at discharge.  Return in about 6 months (around 5/24/2021) for Recheck.         ALMA ROSA Jj MD   10:10 CST  11/24/2020    Skin normal color for race, warm, dry and intact. No evidence of rash.

## 2021-09-19 DIAGNOSIS — E78.5 HYPERLIPIDEMIA, UNSPECIFIED HYPERLIPIDEMIA TYPE: ICD-10-CM

## 2021-09-20 RX ORDER — SIMVASTATIN 20 MG
TABLET ORAL
Qty: 90 TABLET | Refills: 2 | Status: SHIPPED | OUTPATIENT
Start: 2021-09-20 | End: 2022-07-18

## 2021-11-15 ENCOUNTER — TELEPHONE (OUTPATIENT)
Dept: INTERNAL MEDICINE | Facility: CLINIC | Age: 61
End: 2021-11-15

## 2021-11-15 RX ORDER — ATOVAQUONE AND PROGUANIL HYDROCHLORIDE 250; 100 MG/1; MG/1
1 TABLET, FILM COATED ORAL
Qty: 40 TABLET | Refills: 1 | Status: SHIPPED | OUTPATIENT
Start: 2021-11-15 | End: 2022-05-31

## 2021-11-15 NOTE — TELEPHONE ENCOUNTER
Caller: Bruce Mistry    Relationship: Self    Best call back number:  [phone] (H)     If a prescription is needed, what is your preferred pharmacy and phone number: [facility]/PHARMACY #[id number] - [place] - [address] [phone] [facility] [phone]      Additional notes: Pt stated that he is returning to [facility] and Dr. Jj previously prescribed him medication for malaria the last time he traveled. He was unsure of the name, but is asking for another prescription to be sent to the pharmacy.

## 2021-11-29 PROCEDURE — 87635 SARS-COV-2 COVID-19 AMP PRB: CPT | Performed by: NURSE PRACTITIONER

## 2021-12-17 DIAGNOSIS — I10 ESSENTIAL HYPERTENSION: ICD-10-CM

## 2021-12-17 RX ORDER — HYDROCHLOROTHIAZIDE 25 MG/1
TABLET ORAL
Qty: 90 TABLET | Refills: 2 | Status: SHIPPED | OUTPATIENT
Start: 2021-12-17 | End: 2022-09-20

## 2022-01-10 ENCOUNTER — NURSE TRIAGE (OUTPATIENT)
Dept: CALL CENTER | Facility: HOSPITAL | Age: 62
End: 2022-01-10

## 2022-01-10 NOTE — TELEPHONE ENCOUNTER
"He has physical due This week with Dr. Jj, was wondering if could get labs drawn at [facility] instead of going to [facility]. todl him to call office this AM and ask.     Reason for Disposition  • [1] Caller requesting NON-URGENT health information AND [2] PCP's office is the best resource    Additional Information  • Negative: [1] Caller is not with the adult (patient) AND [2] reporting urgent symptoms  • Negative: Lab result questions  • Negative: Medication questions  • Negative: Caller can't be reached by phone  • Negative: Caller has already spoken to PCP or another triager  • Negative: RN needs further essential information from caller in order to complete triage  • Negative: Requesting regular office appointment  • Negative: Health Information question, no triage required and triager able to answer question  • Negative: General information question, no triage required and triager able to answer question    Answer Assessment - Initial Assessment Questions  1. REASON FOR CALL or QUESTION: \"What is your reason for calling today?\" or \"How can I best help you?\" or \"What question do you have that I can help answer?\"      Can get labs drawn at [place] office instead of hospital    Protocols used: INFORMATION ONLY CALL - NO TRIAGE-ADULT-AH      "

## 2022-01-10 NOTE — TELEPHONE ENCOUNTER
PATIENT HAS BEEN CALLED, IT WAS EXPLAINED TO PATIENT THAT HE [facility] SHOULD BE ABLE TO SEE THE ORDERS.  PATIENT STATED THAT HE IS AT THE CLINIC NOW.

## 2022-01-13 ENCOUNTER — OFFICE VISIT (OUTPATIENT)
Dept: INTERNAL MEDICINE | Facility: CLINIC | Age: 62
End: 2022-01-13

## 2022-01-13 VITALS
SYSTOLIC BLOOD PRESSURE: 140 MMHG | BODY MASS INDEX: 27.78 KG/M2 | RESPIRATION RATE: 15 BRPM | DIASTOLIC BLOOD PRESSURE: 85 MMHG | WEIGHT: 177 LBS | HEIGHT: 67 IN | OXYGEN SATURATION: 98 % | HEART RATE: 74 BPM | TEMPERATURE: 98.6 F

## 2022-01-13 DIAGNOSIS — Z00.00 ENCOUNTER FOR PREVENTIVE CARE: Primary | ICD-10-CM

## 2022-01-13 DIAGNOSIS — E78.5 HYPERLIPIDEMIA, UNSPECIFIED HYPERLIPIDEMIA TYPE: ICD-10-CM

## 2022-01-13 DIAGNOSIS — Z71.85 VACCINE COUNSELING: ICD-10-CM

## 2022-01-13 DIAGNOSIS — Z13.31 DEPRESSION SCREEN: ICD-10-CM

## 2022-01-13 DIAGNOSIS — I10 ESSENTIAL HYPERTENSION: ICD-10-CM

## 2022-01-13 PROCEDURE — 99396 PREV VISIT EST AGE 40-64: CPT | Performed by: INTERNAL MEDICINE
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
## 2022-01-24 ENCOUNTER — TELEPHONE (OUTPATIENT)
Dept: NEUROLOGY | Age: 62
End: 2022-01-24

## 2022-01-24 NOTE — TELEPHONE ENCOUNTER
Tried to call patient but it went straight to  so a message was left that we have him scheduled for 02/08/2022 at 11:15 and that he has to keep this appointment or call our office with in 72 hours to cancel or reschedule.

## 2022-01-24 NOTE — TELEPHONE ENCOUNTER
Julia Alonzo called to schedule a new pt. appt. from referral he had sent in June 2021. Pt. has canceled a couple times but wanting to reschedule now. Didn't know if this referral would still be good. .     Please be advised that the best time to call him to accommodate their needs is Anytime. If he doesn't answer please leave message. Thank you.

## 2022-02-08 ENCOUNTER — OFFICE VISIT (OUTPATIENT)
Dept: NEUROLOGY | Age: 62
End: 2022-02-08
Payer: COMMERCIAL

## 2022-02-08 VITALS
HEART RATE: 79 BPM | SYSTOLIC BLOOD PRESSURE: 135 MMHG | BODY MASS INDEX: 26.37 KG/M2 | WEIGHT: 168 LBS | OXYGEN SATURATION: 97 % | DIASTOLIC BLOOD PRESSURE: 80 MMHG | HEIGHT: 67 IN

## 2022-02-08 DIAGNOSIS — Z99.89 CPAP (CONTINUOUS POSITIVE AIRWAY PRESSURE) DEPENDENCE: ICD-10-CM

## 2022-02-08 DIAGNOSIS — G47.33 OBSTRUCTIVE SLEEP APNEA: Primary | ICD-10-CM

## 2022-02-08 PROCEDURE — 99203 OFFICE O/P NEW LOW 30 MIN: CPT | Performed by: PHYSICIAN ASSISTANT

## 2022-02-08 PROCEDURE — 4004F PT TOBACCO SCREEN RCVD TLK: CPT | Performed by: PHYSICIAN ASSISTANT

## 2022-02-08 PROCEDURE — G8427 DOCREV CUR MEDS BY ELIG CLIN: HCPCS | Performed by: PHYSICIAN ASSISTANT

## 2022-02-08 PROCEDURE — G8484 FLU IMMUNIZE NO ADMIN: HCPCS | Performed by: PHYSICIAN ASSISTANT

## 2022-02-08 PROCEDURE — 3017F COLORECTAL CA SCREEN DOC REV: CPT | Performed by: PHYSICIAN ASSISTANT

## 2022-02-08 PROCEDURE — G8419 CALC BMI OUT NRM PARAM NOF/U: HCPCS | Performed by: PHYSICIAN ASSISTANT

## 2022-02-08 NOTE — PROGRESS NOTES
Patient had a sleep study about 9 years and [facility] said machine was so old and they can not get any reading off the machine.

## 2022-02-08 NOTE — PROGRESS NOTES
[facility]  [address] [place] [address] Drive, [address]  [place], Lilli Olmos  Phone [phone]  Fax [phone] SLEEP    Patient: Yuli Pimentel  :  1960  Age:  57 y.o. MRN:  [id number]  Today: 22    Provider:  Lilia Dickerson PA-C    Chief Complaint:  Chief Complaint   Patient presents with    Sleep Apnea     new patient        History Source: History obtained from chart review and the patient. PCP: Robbin Dan MD     Referring Provider: Robbin Dan MD  [address]  [place]  [address]    HISTORY OF PRESENT ILLNESS:   Yuli Pimentel is a 57y.o. year old male with a history of WILLIE who was referred for a sleep evaluation/new CPAP    Today he reports that he was diagnosed with WILLIE in . He is prescribed CPAP therapy. He has never replaced the device. He reports that his previous WILLIE symptoms are controlled. He denies snoring, witnessed apneas, and excessive daytime somnolence with CPAP use. He does not gasp or choke. He uses CPAP nightly and sleeps for about 8 hours. He does not have difficulty initiating or maintaining sleep. His sleep is restorative. He is unsure of the current CPAP pressure but he does not have any intolerance issues, despite losing 30 lbs over the last 1.5 years. He wears a FFM and rarely has a dry mouth.        PAST MEDICAL HITORY:    Medical History:  Past Medical History:   Diagnosis Date    Arthritis     Hyperlipidemia     Hypertension     Sleep apnea        Surgical History:  Past Surgical History:   Procedure Laterality Date    ARM SURGERY Left 2021    LEFT INSITU CUBITAL TUNNEL DECOMPRESSION performed by Eve Rea MD at [address] Bilateral     43 years ago       Current Medications:  Current Outpatient Medications   Medication Sig Dispense Refill    CPAP Machine MISC by Does not apply route      cetirizine (ZYRTEC) 10 MG tablet Take 10 mg by mouth daily      hydroCHLOROthiazide (HYDRODIURIL) 25 MG tablet Take 25 mg by mouth daily      simvastatin (ZOCOR) 20 MG tablet Take 20 mg by mouth nightly      tamsulosin (FLOMAX) 0.4 MG capsule Take 0.4 mg by mouth daily       No current facility-administered medications for this visit. Allergies:  Patient has no known allergies. SOCIAL HISTORY:   Social History     Substance and Sexual Activity   Alcohol Use Never     Social History     Substance and Sexual Activity   Drug Use Never     Social History     Tobacco Use   Smoking Status Current Every Day Smoker    Types: Cigarettes, Cigars   Smokeless Tobacco Never Used       FAMILY HISTORY:   No family history on file. REVIEW OF SYSTEMS    Constitutional: []Fever []Sweats []Chills [] Recent Injury   [x] Denies all unless marked  HENT:[]Headache  [] Head Injury  [] Sore Throat  [] Ear Pain  [] Dizziness [] Hearing Loss   [x] Denies all unless marked  Musculoskeletal: [] Arthralgia  [] Myalgias [] Muscle cramps  [] Muscle twitches   [x] Denies all unless marked   Spine:  [] Neck pain  [] Back pain  [] Sciaticia  [x] Denies all unless marked  Neurological:[] Visual Disturbance [] Double Vision [] Slurred Speech [] Trouble swallowing  [] Vertigo [] Tingling [] Numbness [] Weakness [] Loss of Balance   [] Loss of Consciousness [] Memory Loss [] Seizures  [x] Denies all unless marked  Psychiatric/Behavioral:[] Depression [] Anxiety  [x] Denies all unless marked  Sleep: []  Insomnia [] Sleep Disturbance [] Snoring [] Restless Legs [] Daytime Sleepiness [x] Sleep Apnea  [x] Denies all unless marked    The MA has completed the ROS with the patient. I have reviewed it in its' entirety with the patient and agree with the documentation.      PHYSICAL EXAM  /80   Pulse 79   Ht 5' 7\" (1.702 m)   Wt 168 lb (76.2 kg)   SpO2 97%   BMI 26.31 kg/m²    Neck circumference:  15.75 inches  Mallampati: Type 4  Constitutional   Alert in NAD, well developed, pleasant and polysomnogram report(s)      []  :  [place] Sleepiness Scale      IMPRESSION:    ICD-10-CM    1. Obstructive sleep apnea  G47.33    2. CPAP (continuous positive airway pressure) dependence  Z99.89         PLAN:  1. No orders of the defined types were placed in this encounter. 2.  Patient advised of the etiology, pathophysiology, diagnosis, treatment options, and risks of untreated WILLIE. Risks may include, but are not limited to  hypertension, coronary artery disease, atrial fibrillation, CHF, diabetes, stroke, weight gain, impaired cognition, daytime somnolence,  and motor vehicle accidents. Advised to abstain from driving or operating heavy machinery when drowsy and the use of respiratory suppressants. 3.  We had a discussion about the clinical issues and went over the various important aspects to consider. In regards to CPAP replacement, I explained  that we typically re-test if WILLIE symptoms return, despite PAP therapy. We suggest repeat study  if there has been weight loss or weight gain, especially if symptomatic for WILLIE, despite PAP therapy, or for new onset pressure intolerance. We suggest a repeat study if it has been >5-7 years since last study to assess for any new sleep breathing  disorder (CSA) and to determine if patient may require alternate therapy. Treatment plan and potential diagnostic studies were discussed. All questions were answered. Pt voiced understanding and indicated that he really did not want to complete a repeat study, as his symptoms are stable on the present CPAP device. He has not had any new onset symptoms of under treated WILLIE. He has lost weight, but is not intolerant to the present CPAP settings.    4. The following educational material has been included in this visit after visit summary for your review:  WILLIE/PAP guidelines/sleep studies/CPAP-discussed with pt.  5.  He  will be required to be evaluated for clinical benefit and  compliance during a 30 day period within the preceding 90 days of set up. 6.  Order-auto CPAP with a pressure range of 8cm to 16cm-[facility]  7. Order-supplies  8.   Follow up in 3 months    REFERRAL  Referred to: DME provider of patient's choice  Omid Rivero is referred for the following:    DME Equipment HPCPS Code Setting        Auto Adjusting CPAP device with flex or comparable pressure relief per comfort  8cm to 16cm   Heated Humidifier  Patient Choice     Diagnoses:  Obstructive sleep apnea (G47.33)  Length of Need: Lifetime, 99    Ordering Provider: Lissy Pacheco PA-C  NPI:  [id number]    Replinishible PAP Supplies, 1 year supply  Item HPCPS Code Frequency   Mask of choice  or  1 per 3 months   Nasal Mask cushion/pillows  or  2 per 30 days   Full Face Mask Interface  1 per 30 days   Headgear  1 per 6 months   Tubing, length of choice  or  1 per 3 months   Water Chamber  1 per 6 months   Chinstrap  1 per 6 months   Disposable Filters  2 per 30 days   Reusable Filters  1 per 6 months     Diagnoses:  Obstructive sleep apnea (G47.33)  Length of Need: Lifetime, 99    Ordering Provider: Lissy Pacheco PA-C  NPI:  [id number]

## 2022-02-08 NOTE — PATIENT INSTRUCTIONS
Patient education: Sleep apnea in adults       INTRODUCTION  Normally during sleep, air moves through the throat and in and out of the lungs at a regular rhythm. In a person with sleep apnea, air movement is periodically diminished or stopped. There are two types of sleep apnea: obstructive sleep apnea and central sleep apnea. In obstructive sleep apnea, breathing is abnormal because of narrowing or closure of the throat. In central sleep apnea, breathing is abnormal because of a change in the breathing control and rhythm. Sleep apnea is a serious condition that can affect a person's ability to safely perform normal daily activities and can affect long term health. Approximately 25 percent of adults are at risk for sleep apnea of some degree. Men are more commonly affected than women. Other risk factors include middle and older age, being overweight or obese, and having a small mouth and throat. This topic review focuses on the most common type of sleep apnea in adults, obstructive sleep apnea (WILLIE). HOW SLEEP APNEA OCCURS  The throat is surrounded by muscles that control the airway for speaking, swallowing, and breathing. During sleep, these muscles are less active, and this causes the throat to narrow. In most people, this narrowing does not affect breathing. In others, it can cause snoring, sometimes with reduced or completely blocked airflow. A completely blocked airway without airflow is called an obstructive apnea. Partial obstruction with diminished airflow is called a hypopnea. A person may have apnea and hypopnea during sleep. Insufficient breathing due to apnea or hypopnea causes oxygen levels to fall and carbon dioxide to rise. Because the airway is blocked, breathing faster or harder does not help to improve oxygen levels until the airway is reopened. Typically, the obstruction requires the person to awaken to activate the upper airway muscles.  Once the airway is opened, the person then takes several deep breaths to catch up on breathing. As the person awakens, he or she may move briefly, snort or snore, and take a deep breath. Less frequently, a person may awaken completely with a sensation of gasping, smothering, or choking. If the person falls back to sleep quickly, he or she will not remember the event. Many people with sleep apnea are unaware of their abnormal breathing in sleep, and all patients underestimate how often their sleep is interrupted. Awakening from sleep causes sleep to be unrefreshing and causes fatigue and daytime sleepiness. Anatomic causes of obstructive sleep apnea   Most patients have WILLIE because of a small upper airway. As the bones of the face and skull develop, some people develop a small lower face, a small mouth, and a tongue that seems too large for the mouth. These features are genetically determined, which explains why WILLIE tends to cluster in families. Obesity is another major factor. Tonsil enlargement can be an important cause, especially in children. SLEEP APNEA SYMPTOMS  The main symptoms of WILLIE are loud snoring, fatigue, and daytime sleepiness. However, some people have no symptoms. For example, if the person does not have a bed partner, he or she may not be aware of the snoring. Fatigue and sleepiness have many causes and are often attributed to overwork and increasing age. As a result, a person may be slow to recognize that they have a problem. A bed partner or spouse often prompts the patient to seek medical care. Other symptoms may include one or more of the following:  ?Restless sleep  ? Awakening with choking, gasping, or smothering  ? Morning headaches, dry mouth, or sore throat  ? Waking frequently to urinate  ? Awakening unrested, groggy  ? Low energy, difficulty concentrating, memory impairment    Risk factors  Certain factors increase the risk of sleep apnea.   ?Increasing age [de-identified] WILLIE occurs at all ages, but it is more common in middle and older age adults. ?Male sex  WILLIE is two times more common in men, especially in middle age. ?Obesity  The more obese a person is, the more likely he or she is to have WILLIE. ? Sedation from medication or alcohol  This interferes with the ability to awaken from sleep and can lengthen periods of apnea (no breathing), with potentially dangerous consequences. ? Abnormality of the airway. SLEEP APNEA CONSEQUENCES  Complications of sleep apnea can include daytime sleepiness and difficulty concentrating. The consequence of this is an increased risk of accidents and errors in daily activities. Studies have shown that people with severe WILLIE are more than twice as likely to be involved in a motor vehicle accident as people without these conditions. People with WILLIE are encouraged to discuss options for driving, working, and performing other high-risk tasks with a healthcare provider. In addition, people with untreated WILLIE may have an increased risk of cardiovascular problems such as high blood pressure, heart attack, abnormal heart rhythms, or stroke. This risk may be due to changes in the heart rate and blood pressure that occur during sleep. SLEEP APNEA DIAGNOSIS  The diagnosis of WILLIE is best made by a knowledgeable sleep medicine specialist who has an understanding of the individual's health issues. The diagnosis is usually based upon the person's medical history, physical examination, and testing, including:  ? A complaint of snoring and ineffective sleep  ? Neck size (greater than 16 inches in men or 14 inches in women) is associated with an increased risk of sleep apnea  ? A small upper airway: difficulty seeing the throat because of a tongue that is large for the mouth  ? High blood pressure, especially if it is resistant to treatment  ? If a bed partner has observed the patient during episodes of stopped breathing (apnea), choking, or gasping during sleep, there is a strong possibility of sleep apnea.     Testing is usually performed in a sleep laboratory. A full sleep study is called a polysomnogram. The polysomnogram measures the breathing effort and airflow, blood oxygen level, heart rate and rhythm, duration of the various stages of sleep, body position, and movement of the arms/legs. Home monitoring devices are available that can perform a sleep study. This is a reasonable alternative to conventional testing in a sleep laboratory if the clinician strongly suspects moderate or severe sleep apnea and the patient does not have other illnesses or sleep disorders that may interfere with the results. SLEEP APNEA TREATMENT  Sleep apnea is best treated by a knowledgeable sleep medicine specialist. The goal of treatment is to maintain an open airway during sleep. Effective treatment will eliminate the symptoms of sleep disturbance; long-term health consequences are also reduced. Most treatments require nightly use. The challenge for the clinician and the patient is to select an effective therapy that is appropriate for the patient's problem and that is acceptable for long term use. Auto-titrating CPAP delivers an amount of PAP that varies during the night. The variation is dependent on event detection software algorithms, which will increase the pressure gradually in response to flow changes until adequate patency is detected. After a period of sustained upper airway patency, the delivered level of pressure gradually decreases until the algorithm identifies recurrent upper airway obstruction, at which point the delivered pressure again increases. The result is that the delivered pressure varies throughout the night, in an effort to provide the lowest pressure that is necessary to maintain upper airway patency. Continuous positive airway pressure (CPAP)  The most effective treatment for sleep apnea uses air pressure from a mechanical device to keep the upper airway open during sleep.  A CPAP (continuous positive airway pressure)  device uses an air-tight attachment to the nose, typically a mask, connected to a tube and a blower which generates the pressure. Devices that fit comfortably into the nasal opening, rather than over the nose, are also available. CPAP should be used any time the person sleeps (day or night). The CPAP device is usually used for the first time in the sleep lab, where a technician can adjust the pressure and select the best equipment to keep the airway open. Alternatively, an auto device with a self-adjusting pressure feature, provided with proper education and training, can get treatment started without another sleep test. While the treatment may seem uncomfortable, noisy, or bulky at first, most people accept the treatment after experiencing better sleep. However, difficulty with mask comfort and nasal congestion prevent up to 50 percent of people from using the treatment on a regular basis. Continued follow up with a healthcare provider helps to ensure that the treatment is effective and comfortable. Information from the CPAP machine is often used by physicians, therapists, and insurers to track the success of treatment. CPAP can be delivered with different features to improve comfort and solve problems that may come up during treatment. Changes in treatment may be needed if symptoms do not improve or if the persons condition changes, such as a gain or loss of weight. Adjust sleep position  Adjusting sleep position (to stay off the back) may help improve sleep quality in people who have WILLIE when sleeping on the back. However, this is difficult to maintain throughout the night and is rarely an adequate solution. Weight loss  Weight loss may be helpful for obese or overweight patients. Weight loss may be accomplished with dietary changes, exercise, and/or surgical treatment.  However, it can be difficult to maintain weight loss; the five-year success of non-surgical weight loss is only 5 percent, meaning that 95 percent of people regain lost weight. Avoid alcohol and other sedatives  Alcohol can worsen sleepiness, potentially increasing the risk of accidents or injury. People with WILLIE are often counseled to drink little to no alcohol, even during the daytime. Similarly, people who take anti-anxiety medications or sedatives to sleep should speak with their healthcare provider about the safety of these medications. People with WILLIE must notify all healthcare providers, including surgeons, about their condition and the potential risks of being sedated. People with WILLIE who are given anesthesia and/or pain medications require special management and close monitoring to reduce the risk of a blocked airway. Dental devices  A dental device, called an oral appliance or mandibular advancement device, can reposition the jaw (mandible), bringing the tongue and soft palate forward as well. This may relieve obstruction in some people. This treatment is excellent for reducing snoring, although the effect on WILLIE is sometimes more limited. As a result, dental devices are best used for mild cases of WILLIE when relief of snoring is the main goal. Failure to tolerate and accept CPAP is another indication for dental devices. While dental devices are not as effective as CPAP for WILLIE, some patients prefer a dental device to CPAP. Side effects of dental devices are generally minor but may include changes to the bite with prolonged use. Surgical treatment  Surgery is an alternative therapy for patients who cannot tolerate or do not improve with nonsurgical treatments such as CPAP or oral devices. Surgery can also be used in combination with other nonsurgical treatments. Surgical procedures reshape structures in the upper airways or surgically reposition bone or soft tissue. Uvulopalatopharyngoplasty (UPPP) removes the uvula and excessive tissue in the throat, including the tonsils, if present.  Other procedures, such as established. Annual visits are reasonable, with more frequent visits in between if new issues arise. The purpose of long-term follow-up is to assess usage and monitor for recurrent WILLIE, new side effects, air leakage, and fluctuations in body weight. WHERE TO GET MORE INFORMATION  Your healthcare provider is the best source of information for questions and concerns related to your medical problem. Organizations  American Sleep Apnea Association  Provides information about sleep apnea to the public, publishes a newsletter, and serves as an advocate for people with the disorder. Harshad, [address]   Kat@[employer]. org   [employer].. org   Tel: [phone]   Fax: [facility] that works to [employer] and safety by promoting public understanding of sleep and sleep disorders. Supports sleep-related education, research, and advocacy; produces and distributes educational materials to the public and healthcare professionals; and offers postdoctoral fellowships and grants for sleep researchers. [address]   Celio@[employer]. org   [email or web address]. org   Tel: [phone]   Fax: [phone]    Important information:  Medicare/private insurance CPAP/BiPAP/APAP requirements:  Medicare/private insurance has specific requirements for PAP compliance that must be met during the first 90 days of use to continue coverage for CPAP/BiPAP/APAP  from day 91 and beyond. The policy requires that patients use a PAP device 4 hours per 24 hour period, at least 70% of the time over a 30 day period. This data must be downloaded as a report direct from the PAP devices. This is called a compliance download. Your PAP supplier will assist you in this matter.      Note:  Where applicable, we will utilize PAP device efficiency reports, additional testing, and face-to-face  clinical evaluation subsequent to any treatment, changes in treatment, and continued treatment. Caution:  Please abstain from driving or engaging in other activities which may be hazardous in the presence of diminished alertness or daytime drowsiness. And avoid the use of sedatives or alcohol, which can worsen sleep apnea and daytime drowsiness. Mask suggestions:  -     Resmed Airfit N20 (Nasal) or F20 (Full face mask). They conform to your face, thus decreasing the potential for mask leakage. You might like the AirTouch F20(full face mask). It has a \"memory foam\" like cushion. The AirFit F30 is a smaller style full face mask designed to sit low on and cover less of your face for fewer facial marks. AirFit N30i has a top of the head tube with a nasal mask. AirFit P10 reported to be the most comfortable nasal pillow mask. Resmed Mirage FX reported to be the most comfortable nasal mask. Resmed Mirage Sharp reported to be the most comfortable hybrid mask. AirTouch N20-memory foam nasal mask. Respironics: You might also like to try a nasal mask called a Dreamwear nasal mask or the Dreamwear nasal pillow. Another suggestion is the [facility], it is a minimal contact full face mask. The Dayo Fresh incredible under the nose design makes it the only full face mask that won't cause red marks on the bridge of your nose when compared to other full face masks. The Dreamwear full face mask has a  soft feel, unique in-frame air-flow, and innovative air tube connection at the top of the head for the ultimate in sleep comfort. Comfort Gel Blue. Dreamwear gel pillows. Darren [employer] Joann: Brevida nasal pillow mask and Simplus FFM    The use of a memory foam CPAP pillow supports the head and neck throughout the night.

## 2022-03-29 ENCOUNTER — LAB (OUTPATIENT)
Dept: LAB | Facility: HOSPITAL | Age: 62
End: 2022-03-29

## 2022-03-29 ENCOUNTER — TELEPHONE (OUTPATIENT)
Dept: URGENT CARE | Facility: CLINIC | Age: 62
End: 2022-03-29

## 2022-03-29 DIAGNOSIS — Z20.822 ENCOUNTER FOR LABORATORY TESTING FOR COVID-19 VIRUS: Primary | ICD-10-CM

## 2022-03-29 DIAGNOSIS — Z20.822 ENCOUNTER FOR LABORATORY TESTING FOR COVID-19 VIRUS: ICD-10-CM

## 2022-03-29 LAB — SARS-COV-2 RNA PNL SPEC NAA+PROBE: NOT DETECTED

## 2022-03-29 PROCEDURE — 87635 SARS-COV-2 COVID-19 AMP PRB: CPT

## 2022-03-29 PROCEDURE — C9803 HOPD COVID-19 SPEC COLLECT: HCPCS

## 2022-04-08 RX ORDER — TAMSULOSIN HYDROCHLORIDE 0.4 MG/1
CAPSULE ORAL
Qty: 180 CAPSULE | Refills: 2 | Status: SHIPPED | OUTPATIENT
Start: 2022-04-08 | End: 2022-12-28

## 2022-05-09 ENCOUNTER — TELEPHONE (OUTPATIENT)
Dept: NEUROLOGY | Age: 62
End: 2022-05-09

## 2022-05-09 NOTE — TELEPHONE ENCOUNTER
Raj Costa requests a call back from the nurse please. He advised that he is going out of town on the date of his next appt, but needs a reading from his cpap machine to show that he has been using it so that his Insurance will continue to pay for it and supplies. Thank you.

## 2022-05-09 NOTE — TELEPHONE ENCOUNTER
Called and spoke with patient to let him know that his DME company would need to send his office note to his company due to they are the ones that bill for his machine. Patient stated that he was not able to come in to the office for his appointment on 05-19-21 due to he will be out of town. Provider does not have any sooner appointments sooner than the 19th. Patient is not able to do a VV due to patient will be out of stated. I let patient know that he will need to contact his DME company to see what they could do an extension.

## 2022-05-09 NOTE — TELEPHONE ENCOUNTER
Alesia Rodriguez I called the patient and he was wanting to know if there was a way that a download could be pulled and send it to the insurance to show he has used his machine. I told him I was not sure if that could be done or not. Patient said he could not come to his appointment on the 19th. I told him I would send this to the sleep coordinator and see and then have you call him back if your able to or not able to send then information.

## 2022-05-18 ENCOUNTER — OFFICE VISIT (OUTPATIENT)
Dept: NEUROLOGY | Age: 62
End: 2022-05-18
Payer: COMMERCIAL

## 2022-05-18 VITALS
BODY MASS INDEX: 27.47 KG/M2 | DIASTOLIC BLOOD PRESSURE: 87 MMHG | SYSTOLIC BLOOD PRESSURE: 134 MMHG | OXYGEN SATURATION: 98 % | WEIGHT: 175 LBS | HEART RATE: 77 BPM | HEIGHT: 67 IN

## 2022-05-18 DIAGNOSIS — Z99.89 CPAP (CONTINUOUS POSITIVE AIRWAY PRESSURE) DEPENDENCE: ICD-10-CM

## 2022-05-18 DIAGNOSIS — G47.33 OBSTRUCTIVE SLEEP APNEA: Primary | ICD-10-CM

## 2022-05-18 PROCEDURE — G8427 DOCREV CUR MEDS BY ELIG CLIN: HCPCS | Performed by: PHYSICIAN ASSISTANT

## 2022-05-18 PROCEDURE — 99214 OFFICE O/P EST MOD 30 MIN: CPT | Performed by: PHYSICIAN ASSISTANT

## 2022-05-18 PROCEDURE — G8419 CALC BMI OUT NRM PARAM NOF/U: HCPCS | Performed by: PHYSICIAN ASSISTANT

## 2022-05-18 PROCEDURE — 3017F COLORECTAL CA SCREEN DOC REV: CPT | Performed by: PHYSICIAN ASSISTANT

## 2022-05-18 PROCEDURE — 4004F PT TOBACCO SCREEN RCVD TLK: CPT | Performed by: PHYSICIAN ASSISTANT

## 2022-05-18 NOTE — PROGRESS NOTES
[facility]  [address] [place] [address] Drive, [address]  [facility], [facility]  Phone [phone]  Fax [phone]       [facility] Sleep Follow Up Encounter      Information:   Patient Name: Archana Osborne  :   1960  Age:   60 y.o. MRN:   [id number]  Account #:  [de-identified]  Today:                22    Provider:  German Guzman PA-C    Chief Complaint   Patient presents with    Sleep Apnea     FOLLOW UP         Subjective:   Archana Osborne is a 60 y.o. male  with a history of WILLIE who comes in for a sleep clinic follow up. He was diagnosed with WILLIE in  at [facility]. The PSG revealed an AHI of 30.2. He received orders for a replacement CPAP, 2022. The CPAP is set at a pressure range of 8cm to 16cm. The compliance report indicates that he is averaging 7.5 hours of PAP use per day. He reports that consistent PAP use has alleviated the previous WILLIE symptoms. Objective:     Past Medical History:   Diagnosis Date    Arthritis     Hyperlipidemia     Hypertension     Sleep apnea        Past Surgical History:   Procedure Laterality Date    ARM SURGERY Left 2021    LEFT INSITU CUBITAL TUNNEL DECOMPRESSION performed by Emma Rosario MD at [address] Bilateral     43 years ago       Recent Hospitalizations  ·     Significant Injuries  ·     No family history on file.     Social History  Social History     Tobacco Use   Smoking Status Current Every Day Smoker    Types: Cigarettes, Cigars   Smokeless Tobacco Never Used     Social History     Substance and Sexual Activity   Alcohol Use Never     Social History     Substance and Sexual Activity   Drug Use Never         Current Outpatient Medications   Medication Sig Dispense Refill    CPAP Machine MISC by Does not apply route      cetirizine (ZYRTEC) 10 MG tablet Take 10 mg by mouth daily      hydroCHLOROthiazide (HYDRODIURIL) 25 MG tablet Take 25 mg by mouth daily      simvastatin (ZOCOR) 20 MG tablet Take 20 mg by mouth nightly      tamsulosin (FLOMAX) 0.4 MG capsule Take 0.4 mg by mouth daily       No current facility-administered medications for this visit. Allergies:  Patient has no known allergies. REVIEW OF SYSTEMS     Constitutional: []? Fever []? Sweats []? Chills []? Recent Injury   [x]? Denies all unless marked  HENT:[]? Headache  []? Head Injury  []? Sore Throat  []? Ear Pain  []? Dizziness []? Hearing Loss   [x]? Denies all unless marked  Musculoskeletal: []? Arthralgia  []? Myalgias []? Muscle cramps  []? Muscle twitches   [x]? Denies all unless marked   Spine:  []? Neck pain  []? Back pain  []? Sciaticia  [x]? Denies all unless marked  Neurological:[]? Visual Disturbance []? Double Vision []? Slurred Speech []? Trouble swallowing  []? Vertigo []? Tingling []? Numbness []? Weakness []? Loss of Balance   []? Loss of Consciousness []? Memory Loss []? Seizures  [x]? Denies all unless marked  Psychiatric/Behavioral:[]? Depression []? Anxiety  [x]? Denies all unless marked  Sleep: []? Insomnia []? Sleep Disturbance []? Snoring []? Restless Legs []? Daytime Sleepiness [x]? Sleep Apnea  []? Denies all unless marked    The MA has completed the ROS with the patient. I have reviewed it in its' entirety with the patient and agree with the documentation. PHYSICAL EXAM  /87   Pulse 77   Ht 5' 7\" (1.702 m)   Wt 175 lb (79.4 kg)   SpO2 98%   BMI 27.41 kg/m²     Constitutional -  Alert in NAD, well developed, pleasant and cooperative with exam  HEENT- Conjunctiva normal.  No scars, masses, or lesions over external nose or ears, hearing intact, no neck masses noted, no jugular vein distension, no bruit  Cardiac- Regular rate and rhythm  Pulmonary- Clear to auscultation, good expansion, normal effort without use of accessory muscles  Musculoskeletal - No significant wasting of muscles noted.  No bony deformities  Extremities - No clubbing, cyanosis or edema  Skin - Warm, dry, and intact. No rash, erythema, or pallor  Psychiatric - Mood, affect, and behavior appear normal      Neurological exam  Awake, alert, fluent oriented  appropriate affect  Attention and concentration appear appropriate  Recent and remote memory appears unremarkable  Speech normal without dysarthria  No clear issues with language of fund of knowledge    Cranial Nerve Exam     CN III, IV,VI-EOMI, No nystagmus, conjugate eye movements, no ptosis  CN VII-No facial assymetry    Motor Exam    Antigravity throughout upper and lower extremities bilaterally    Tremors and coordination    No tremors in hands or head noted     Gait    Normal base and speed  No ataxia    I reviewed the following studies:       []  :  Clinical laboratory test results     []  :  Radiology reports                    [x]  :  Review and summarization of medical records-compliance report      []     Request for medical records       [x]  :  Reviewed previous/recent polysomnogram report(s)-2014 PSG reviewed      []  :  [place] Sleepiness Scale       [x]  :  Compliance report :  The auto CPAP is set at a pressure range of 8cm to 16cm. Compliance download shows that he uses device: 97% of the time;  percentage of days with usage >=4 hours: 97%. AHI: 0.9    Assessment:       ICD-10-CM    1. Obstructive sleep apnea  G47.33    2. CPAP (continuous positive airway pressure) dependence  Z99.89           []  :  Stable     []  :  Improved                       [x]  :  Well controlled              []  :  Resolving     []  :  Resolved     []  :  Inadequately controlled     []  :  Worsening     []  :  Additional workup planned    Devon Butler is compliant and benefiting from therapy as indicated by compliance evaluation and patient report. Plan:     No orders of the defined types were placed in this encounter. 1.   Previously advised of the etiology,  pathophysiology, diagnosis, treatment options, and risks of untreated WILLIE.  Risks may include, but are not limited to  hypertension, coronary artery disease, atrial fibrillation, CHF, diabetes, stroke, weight gain, impaired cognition, daytime somnolence, and motor vehicle accidents. Advised to abstain from driving or operating heavy machinery when drowsy and the use of respiratory suppressants. Discussed diagnostic studies and potential treatment plan. 2.  Will evaluate for PAP clinical benefit and and compliance during a 30 day period within the preceding 90 days PRN. 3.  The following educational material has been included in this visit after visit summary for your review: WILLIE/PAP guidelines-Discussed with the patient and all questions fully answered. 4.  Continue PAP  therapy. The patient voices understanding and recognizes the need for adherence to the prescribed therapy  5.   Follow up in 1 year

## 2022-05-18 NOTE — PATIENT INSTRUCTIONS
Patient education: Sleep apnea in adults       INTRODUCTION -- Normally during sleep, air moves through the throat and in and out of the lungs at a regular rhythm. In a person with sleep apnea, air movement is periodically diminished or stopped. There are two types of sleep apnea: obstructive sleep apnea and central sleep apnea. In obstructive sleep apnea, breathing is abnormal because of narrowing or closure of the throat. In central sleep apnea, breathing is abnormal because of a change in the breathing control and rhythm. Sleep apnea is a serious condition that can affect a person's ability to safely perform normal daily activities and can affect long term health. Approximately 25 percent of adults are at risk for sleep apnea of some degree. Men are more commonly affected than women. Other risk factors include middle and older age, being overweight or obese, and having a small mouth and throat. This topic review focuses on the most common type of sleep apnea in adults, obstructive sleep apnea (WILLIE). HOW SLEEP APNEA OCCURS -- The throat is surrounded by muscles that control the airway for speaking, swallowing, and breathing. During sleep, these muscles are less active, and this causes the throat to narrow. In most people, this narrowing does not affect breathing. In others, it can cause snoring, sometimes with reduced or completely blocked airflow. A completely blocked airway without airflow is called an obstructive apnea. Partial obstruction with diminished airflow is called a hypopnea. A person may have apnea and hypopnea during sleep. Insufficient breathing due to apnea or hypopnea causes oxygen levels to fall and carbon dioxide to rise. Because the airway is blocked, breathing faster or harder does not help to improve oxygen levels until the airway is reopened. Typically, the obstruction requires the person to awaken to activate the upper airway muscles.  Once the airway is opened, the person then older age adults. ?Male sex - WILLIE is two times more common in men, especially in middle age. ?Obesity - The more obese a person is, the more likely he or she is to have WILLIE. ? Sedation from medication or alcohol - This interferes with the ability to awaken from sleep and can lengthen periods of apnea (no breathing), with potentially dangerous consequences. ? Abnormality of the airway. SLEEP APNEA CONSEQUENCES -- Complications of sleep apnea can include daytime sleepiness and difficulty concentrating. The consequence of this is an increased risk of accidents and errors in daily activities. Studies have shown that people with severe WILLIE are more than twice as likely to be involved in a motor vehicle accident as people without these conditions. People with WILLIE are encouraged to discuss options for driving, working, and performing other high-risk tasks with a healthcare provider. In addition, people with untreated WILLIE may have an increased risk of cardiovascular problems such as high blood pressure, heart attack, abnormal heart rhythms, or stroke. This risk may be due to changes in the heart rate and blood pressure that occur during sleep. SLEEP APNEA DIAGNOSIS -- The diagnosis of WILLIE is best made by a knowledgeable sleep medicine specialist who has an understanding of the individual's health issues. The diagnosis is usually based upon the person's medical history, physical examination, and testing, including:  ? A complaint of snoring and ineffective sleep  ? Neck size (greater than 16 inches in men or 14 inches in women) is associated with an increased risk of sleep apnea  ? A small upper airway: difficulty seeing the throat because of a tongue that is large for the mouth  ? High blood pressure, especially if it is resistant to treatment  ? If a bed partner has observed the patient during episodes of stopped breathing (apnea), choking, or gasping during sleep, there is a strong possibility of sleep apnea. Testing is usually performed in a sleep laboratory. A full sleep study is called a polysomnogram. The polysomnogram measures the breathing effort and airflow, blood oxygen level, heart rate and rhythm, duration of the various stages of sleep, body position, and movement of the arms/legs. Home monitoring devices are available that can perform a sleep study. This is a reasonable alternative to conventional testing in a sleep laboratory if the clinician strongly suspects moderate or severe sleep apnea and the patient does not have other illnesses or sleep disorders that may interfere with the results. SLEEP APNEA TREATMENT -- Sleep apnea is best treated by a knowledgeable sleep medicine specialist. The goal of treatment is to maintain an open airway during sleep. Effective treatment will eliminate the symptoms of sleep disturbance; long-term health consequences are also reduced. Most treatments require nightly use. The challenge for the clinician and the patient is to select an effective therapy that is appropriate for the patient's problem and that is acceptable for long term use. Auto-titrating CPAP delivers an amount of PAP that varies during the night. The variation is dependent on event detection software algorithms, which will increase the pressure gradually in response to flow changes until adequate patency is detected. After a period of sustained upper airway patency, the delivered level of pressure gradually decreases until the algorithm identifies recurrent upper airway obstruction, at which point the delivered pressure again increases. The result is that the delivered pressure varies throughout the night, in an effort to provide the lowest pressure that is necessary to maintain upper airway patency. Continuous positive airway pressure (CPAP) -- The most effective treatment for sleep apnea uses air pressure from a mechanical device to keep the upper airway open during sleep.  A CPAP (continuous positive airway pressure)  device uses an air-tight attachment to the nose, typically a mask, connected to a tube and a blower which generates the pressure. Devices that fit comfortably into the nasal opening, rather than over the nose, are also available. CPAP should be used any time the person sleeps (day or night). The CPAP device is usually used for the first time in the sleep lab, where a technician can adjust the pressure and select the best equipment to keep the airway open. Alternatively, an auto device with a self-adjusting pressure feature, provided with proper education and training, can get treatment started without another sleep test. While the treatment may seem uncomfortable, noisy, or bulky at first, most people accept the treatment after experiencing better sleep. However, difficulty with mask comfort and nasal congestion prevent up to 50 percent of people from using the treatment on a regular basis. Continued follow up with a healthcare provider helps to ensure that the treatment is effective and comfortable. Information from the CPAP machine is often used by physicians, therapists, and insurers to track the success of treatment. CPAP can be delivered with different features to improve comfort and solve problems that may come up during treatment. Changes in treatment may be needed if symptoms do not improve or if the persons condition changes, such as a gain or loss of weight. Adjust sleep position -- Adjusting sleep position (to stay off the back) may help improve sleep quality in people who have WILLIE when sleeping on the back. However, this is difficult to maintain throughout the night and is rarely an adequate solution. Weight loss -- Weight loss may be helpful for obese or overweight patients. Weight loss may be accomplished with dietary changes, exercise, and/or surgical treatment.  However, it can be difficult to maintain weight loss; the five-year success of non-surgical weight loss is only 5 percent, meaning that 95 percent of people regain lost weight. Avoid alcohol and other sedatives -- Alcohol can worsen sleepiness, potentially increasing the risk of accidents or injury. People with WILLIE are often counseled to drink little to no alcohol, even during the daytime. Similarly, people who take anti-anxiety medications or sedatives to sleep should speak with their healthcare provider about the safety of these medications. People with WILLIE must notify all healthcare providers, including surgeons, about their condition and the potential risks of being sedated. People with WILLIE who are given anesthesia and/or pain medications require special management and close monitoring to reduce the risk of a blocked airway. Dental devices -- A dental device, called an oral appliance or mandibular advancement device, can reposition the jaw (mandible), bringing the tongue and soft palate forward as well. This may relieve obstruction in some people. This treatment is excellent for reducing snoring, although the effect on WILLIE is sometimes more limited. As a result, dental devices are best used for mild cases of WILLIE when relief of snoring is the main goal. Failure to tolerate and accept CPAP is another indication for dental devices. While dental devices are not as effective as CPAP for WILLIE, some patients prefer a dental device to CPAP. Side effects of dental devices are generally minor but may include changes to the bite with prolonged use. Surgical treatment -- Surgery is an alternative therapy for patients who cannot tolerate or do not improve with nonsurgical treatments such as CPAP or oral devices. Surgery can also be used in combination with other nonsurgical treatments. Surgical procedures reshape structures in the upper airways or surgically reposition bone or soft tissue. Uvulopalatopharyngoplasty (UPPP) removes the uvula and excessive tissue in the throat, including the tonsils, if present. Other procedures, such as maxillomandibular advancement (MMA), address both the upper and lower pharyngeal airway more globally. UPPP alone has limited success rates (less than 50 percent) and people can relapse (when WILLIE symptoms return after surgery). As a result, this surgery is only recommended in a minority of people and should be considered with caution. MMA may have a higher success rate, particularly in people with abnormal jaw (maxilla and mandible) anatomy, but it is the most complicated procedure. A newer surgical approach, nerve stimulation to protrude the tongue, has promising success rates in very selected people. Tracheostomy creates a permanent opening in the neck. It is reserved for people with severe disease in whom less drastic measures have failed or are inappropriate. Although it is always successful in eliminating obstructive sleep apnea, tracheostomy requires significant lifestyle changes and carries some serious risks (eg, infection, bleeding, blockage). All surgical treatments require discussions about the goals of treatment, the expected outcomes, and potential complications. Hypoglossal nerve stimulator- \"Inspire\" device    PAP treatment failure:  Possible causes of treatment failure include nonadherence or suboptimal adherence, weight gain, an inappropriate level of prescribed positive pressure, or an additional disorder causing sleepiness (eg, narcolepsy) that may require alterations in the therapeutic regimen. A review of medications should also be undertaken since many drugs may lead to sleepiness. Inadequate sleep time may also negate the expected effects from treatment of WILLIE. Also, pt's can have persistent hypersomnolence associated with sleep apnea even in the presence of adequate therapy and at those times Provigil or Nuvigil or other stimulants may be indicated.     Once the patient's positive airway pressure therapy has been optimized and symptoms resolved, a regimen of long-term follow-up should be established. Annual visits are reasonable, with more frequent visits in between if new issues arise. The purpose of long-term follow-up is to assess usage and monitor for recurrent WILLIE, new side effects, air leakage, and fluctuations in body weight. WHERE TO GET MORE INFORMATION -- Your healthcare provider is the best source of information for questions and concerns related to your medical problem. Organizations  American [employer]nea Association  Provides information about sleep apnea to the public, publishes a newsletter, and serves as an advocate for people with the disorder. [place], [address] [facility], [address] [place] [address]   Nerva@[email or web address] org   [email or web address].. org   Tel: [phone]   Fax: [facility] that works to [employer] and safety by promoting public understanding of sleep and sleep disorders. Supports sleep-related education, research, and advocacy; produces and distributes educational materials to the public and healthcare professionals; and offers postdoctoral fellowships and grants for sleep researchers. Sunita [address]   Reina@[email or web address] org   [email or web address]   Tel: [phone]   Fax: [phone]    Important information:  Medicare/private insurance CPAP/BiPAP/APAP requirements:  Medicare/private insurance has specific requirements for PAP compliance that must be met during the first 90 days of use to continue coverage for CPAP/BiPAP/APAP  from day 91 and beyond. The policy requires that patients use a PAP device 4 hours per 24 hour period, at least 70% of the time over a 30 day period. This data must be downloaded as a report direct from the PAP devices. This is called a compliance download. Your PAP supplier will assist you in this matter.      Note:  Where applicable, we will utilize PAP device efficiency reports, additional testing, and face-to-face  clinical evaluation subsequent to any treatment, changes in treatment, and continued treatment. Caution:  Please abstain from driving or engaging in other activities which may be hazardous in the presence of diminished alertness or daytime drowsiness. And avoid the use of sedatives or alcohol, which can worsen sleep apnea and daytime drowsiness. Mask suggestions:  -     Resmed Airfit N20 (Nasal) or F20 (Full face mask). They conform to your face, thus decreasing the potential for mask leakage. You might like the AirTouch F20(full face mask). It has a \"memory foam\" like cushion. The AirFit F30 is a smaller style full face mask designed to sit low on and cover less of your face for fewer facial marks. AirFit N30i has a top of the head tube with a nasal mask. AirFit P10 reported to be the most comfortable nasal pillow mask. Resmed Mirage FX reported to be the most comfortable nasal mask. Resmed Mirage [place] reported to be the most comfortable hybrid mask. AirTouch N20-memory foam nasal mask. Respironics: You might also like to try a nasal mask called a Dreamwear nasal mask or the Dreamwear nasal pillow. Another suggestion is the [facility], it is a minimal contact full face mask. The Kristian Jeans incredible under the nose design makes it the only full face mask that won't cause red marks on the bridge of your nose when compared to other full face masks. The Dreamwear full face mask has a  soft feel, unique in-frame air-flow, and innovative air tube connection at the top of the head for the ultimate in sleep comfort. Comfort Gel Blue. Dreamwear gel pillows. Darren & Joann: Brevida nasal pillow mask and Simplus FFM    The use of a memory foam CPAP pillow supports the head and neck throughout the night.

## 2022-05-31 ENCOUNTER — OFFICE VISIT (OUTPATIENT)
Dept: INTERNAL MEDICINE | Facility: CLINIC | Age: 62
End: 2022-05-31

## 2022-05-31 VITALS
BODY MASS INDEX: 29.03 KG/M2 | OXYGEN SATURATION: 97 % | HEART RATE: 68 BPM | DIASTOLIC BLOOD PRESSURE: 86 MMHG | SYSTOLIC BLOOD PRESSURE: 137 MMHG | WEIGHT: 185 LBS | HEIGHT: 67 IN

## 2022-05-31 DIAGNOSIS — N40.1 BPH WITH OBSTRUCTION/LOWER URINARY TRACT SYMPTOMS: Primary | ICD-10-CM

## 2022-05-31 DIAGNOSIS — N13.8 BPH WITH OBSTRUCTION/LOWER URINARY TRACT SYMPTOMS: Primary | ICD-10-CM

## 2022-05-31 PROBLEM — Z99.89 CPAP (CONTINUOUS POSITIVE AIRWAY PRESSURE) DEPENDENCE: Status: ACTIVE | Noted: 2022-02-08

## 2022-05-31 PROBLEM — G56.22 CUBITAL TUNNEL SYNDROME ON LEFT: Status: ACTIVE | Noted: 2021-01-14

## 2022-05-31 PROCEDURE — 99213 OFFICE O/P EST LOW 20 MIN: CPT | Performed by: NURSE PRACTITIONER

## 2022-05-31 NOTE — PROGRESS NOTES
Chief Complaint   Patient presents with   • Difficulty Urinating         History:  Bruce Mistry is a 62 y.o. male who presents today for evaluation of the above problems.          Patient reports about 2 weeks ago he was having difficulty urinating and experiencing pain with urination. This worsened with urgency and frequency that kept him up at night and waking him 5-7 times. Denies any changes in diet, hydration, fever, back/flank pain, or blood in urine. He does report continuing his Flomax twice a day but that his new refill from the pharmacy had a different appearance to it.     The pain was worsening after a couple of days so he presented to an outside urgent care on 5/17. He states they had tested his urine and told him he did not have a UTI but they did provide him with an antibiotic for possible prostatitis. He completed the antibiotic with no to minimal relief of symptoms. With the continued pain after completing the antibiotic he decided to take some ibuprofen. With taking the ibuprofen he was able to sleep and after a couple days his pain and difficulty urinating had resolved.       Difficulty Urinating  Pertinent negatives include no chills, nausea or vomiting.   Dysuria   This is a new problem. The current episode started 1 to 4 weeks ago. The problem occurs every urination. The problem has been rapidly improving. The quality of the pain is described as burning. The pain is at a severity of 9/10. There has been no fever. The fever has been present for less than 1 day. He is sexually active. There is no history of pyelonephritis. Associated symptoms include frequency, hesitancy and urgency. Pertinent negatives include no chills, discharge, flank pain, hematuria, nausea, possible pregnancy, sweats or vomiting.       ROS:  Review of Systems   Constitutional: Negative for chills.   Gastrointestinal: Negative for nausea and vomiting.   Genitourinary: Positive for difficulty urinating, dysuria, frequency,  "hesitancy and urgency. Negative for flank pain and hematuria.       No Known Allergies  Past Medical History:   Diagnosis Date   • Arthritis    • Elevated cholesterol    • Hypertension    • Sleep apnea    • Urine troubles      Past Surgical History:   Procedure Laterality Date   • KNEE SURGERY Left 1975   • KNEE SURGERY Right 1981     Family History   Problem Relation Age of Onset   • Cancer Mother    • Other Mother    • Heart disease Father    • Stroke Father      Bruce  reports that he has been smoking cigars. He does not have any smokeless tobacco history on file. He reports current alcohol use of about 3.0 standard drinks of alcohol per week. He reports that he does not use drugs.    I have reviewed and updated the above documentation (if necessary) including but not limited to chief complaint, ROS, PFSH, allergies and medications        Current Outpatient Medications:   •  cetirizine (zyrTEC) 10 MG tablet, Take 10 mg by mouth Daily. Taking 1 daily, Disp: , Rfl:   •  hydroCHLOROthiazide (HYDRODIURIL) 25 MG tablet, TAKE 1 TABLET BY MOUTH DAILY, Disp: 90 tablet, Rfl: 2  •  simvastatin (ZOCOR) 20 MG tablet, TAKE 1 TABLET BY MOUTH EVERY DAY, Disp: 90 tablet, Rfl: 2  •  tamsulosin (FLOMAX) 0.4 MG capsule 24 hr capsule, TAKE 1 CAPSULE BY MOUTH TWICE A DAY, Disp: 180 capsule, Rfl: 2    OBJECTIVE:  Visit Vitals  /86 (BP Location: Right arm, Patient Position: Sitting, Cuff Size: Adult)   Pulse 68   Ht 170.2 cm (67\")   Wt 83.9 kg (185 lb)   SpO2 97%   BMI 28.98 kg/m²      Physical Exam  Vitals and nursing note reviewed.   Constitutional:       Appearance: Normal appearance.   Cardiovascular:      Rate and Rhythm: Normal rate and regular rhythm.      Pulses: Normal pulses.      Heart sounds: Normal heart sounds.   Pulmonary:      Effort: Pulmonary effort is normal.      Breath sounds: Normal breath sounds.   Abdominal:      General: Abdomen is flat.      Palpations: Abdomen is soft.      Tenderness: There is no " abdominal tenderness.   Musculoskeletal:         General: Normal range of motion.   Skin:     General: Skin is warm and dry.   Neurological:      Mental Status: He is alert.         MDM    Assessment/Plan    Diagnoses and all orders for this visit:    1. BPH with obstruction/lower urinary tract symptoms (Primary)    Plan  - Continue Flomax twice as day as prescribed  - Discussed referral to Urology if these symptoms return    Initial assessment and note done by nurse practitioner student.  I have discussed symptoms with patient and performed exam/formulated plan, and I agree with note as above.    Education materials and an After Visit Summary were printed and given to the patient at discharge.  Return if symptoms worsen or fail to improve, for Next scheduled follow up.         PERNELL Gary   09:33 CDT  5/31/2022   Answers for HPI/ROS submitted by the patient on 5/28/2022  What is the primary reason for your visit?: Painful Urination

## 2022-07-17 DIAGNOSIS — E78.5 HYPERLIPIDEMIA, UNSPECIFIED HYPERLIPIDEMIA TYPE: ICD-10-CM

## 2022-07-18 RX ORDER — SIMVASTATIN 20 MG
TABLET ORAL
Qty: 90 TABLET | Refills: 2 | Status: SHIPPED | OUTPATIENT
Start: 2022-07-18

## 2022-09-20 DIAGNOSIS — I10 ESSENTIAL HYPERTENSION: ICD-10-CM

## 2022-09-20 RX ORDER — HYDROCHLOROTHIAZIDE 25 MG/1
TABLET ORAL
Qty: 90 TABLET | Refills: 2 | Status: SHIPPED | OUTPATIENT
Start: 2022-09-20

## 2022-12-29 RX ORDER — TAMSULOSIN HYDROCHLORIDE 0.4 MG/1
1 CAPSULE ORAL 2 TIMES DAILY
Qty: 180 CAPSULE | Refills: 2 | Status: SHIPPED | OUTPATIENT
Start: 2022-12-29

## 2023-01-16 ENCOUNTER — OFFICE VISIT (OUTPATIENT)
Dept: INTERNAL MEDICINE | Facility: CLINIC | Age: 63
End: 2023-01-16
Payer: COMMERCIAL

## 2023-01-16 ENCOUNTER — LAB (OUTPATIENT)
Dept: LAB | Facility: HOSPITAL | Age: 63
End: 2023-01-16
Payer: COMMERCIAL

## 2023-01-16 VITALS
DIASTOLIC BLOOD PRESSURE: 80 MMHG | OXYGEN SATURATION: 98 % | WEIGHT: 192 LBS | SYSTOLIC BLOOD PRESSURE: 122 MMHG | BODY MASS INDEX: 30.13 KG/M2 | HEART RATE: 78 BPM | RESPIRATION RATE: 15 BRPM | HEIGHT: 67 IN

## 2023-01-16 DIAGNOSIS — I10 ESSENTIAL HYPERTENSION: ICD-10-CM

## 2023-01-16 DIAGNOSIS — E78.5 HYPERLIPIDEMIA, UNSPECIFIED HYPERLIPIDEMIA TYPE: ICD-10-CM

## 2023-01-16 DIAGNOSIS — Z13.31 DEPRESSION SCREEN: ICD-10-CM

## 2023-01-16 DIAGNOSIS — Z12.5 PROSTATE CANCER SCREENING: ICD-10-CM

## 2023-01-16 DIAGNOSIS — Z12.12 ENCOUNTER FOR COLORECTAL CANCER SCREENING: ICD-10-CM

## 2023-01-16 DIAGNOSIS — Z00.00 ENCOUNTER FOR PREVENTIVE CARE: ICD-10-CM

## 2023-01-16 DIAGNOSIS — Z00.00 ENCOUNTER FOR PREVENTIVE CARE: Primary | ICD-10-CM

## 2023-01-16 DIAGNOSIS — Z12.11 ENCOUNTER FOR COLORECTAL CANCER SCREENING: ICD-10-CM

## 2023-01-16 LAB
ALBUMIN SERPL-MCNC: 4.9 G/DL (ref 3.5–5.2)
ALBUMIN/GLOB SERPL: 2.1 G/DL
ALP SERPL-CCNC: 55 U/L (ref 39–117)
ALT SERPL W P-5'-P-CCNC: 15 U/L (ref 1–41)
ANION GAP SERPL CALCULATED.3IONS-SCNC: 7 MMOL/L (ref 5–15)
AST SERPL-CCNC: 17 U/L (ref 1–40)
BILIRUB SERPL-MCNC: 0.8 MG/DL (ref 0–1.2)
BUN SERPL-MCNC: 15 MG/DL (ref 8–23)
BUN/CREAT SERPL: 15.2 (ref 7–25)
CALCIUM SPEC-SCNC: 9.5 MG/DL (ref 8.6–10.5)
CHLORIDE SERPL-SCNC: 99 MMOL/L (ref 98–107)
CHOLEST SERPL-MCNC: 168 MG/DL (ref 0–200)
CO2 SERPL-SCNC: 33 MMOL/L (ref 22–29)
CREAT SERPL-MCNC: 0.99 MG/DL (ref 0.76–1.27)
DEPRECATED RDW RBC AUTO: 41.1 FL (ref 37–54)
EGFRCR SERPLBLD CKD-EPI 2021: 85.6 ML/MIN/1.73
ERYTHROCYTE [DISTWIDTH] IN BLOOD BY AUTOMATED COUNT: 11.9 % (ref 12.3–15.4)
GLOBULIN UR ELPH-MCNC: 2.3 GM/DL
GLUCOSE SERPL-MCNC: 93 MG/DL (ref 65–99)
HBA1C MFR BLD: 5.1 % (ref 4.8–5.6)
HCT VFR BLD AUTO: 46.5 % (ref 37.5–51)
HDLC SERPL-MCNC: 48 MG/DL (ref 40–60)
HGB BLD-MCNC: 15.3 G/DL (ref 13–17.7)
LDLC SERPL CALC-MCNC: 99 MG/DL (ref 0–100)
LDLC/HDLC SERPL: 2.02 {RATIO}
MCH RBC QN AUTO: 30.7 PG (ref 26.6–33)
MCHC RBC AUTO-ENTMCNC: 32.9 G/DL (ref 31.5–35.7)
MCV RBC AUTO: 93.2 FL (ref 79–97)
PLATELET # BLD AUTO: 193 10*3/MM3 (ref 140–450)
PMV BLD AUTO: 9.1 FL (ref 6–12)
POTASSIUM SERPL-SCNC: 3.8 MMOL/L (ref 3.5–5.2)
PROT SERPL-MCNC: 7.2 G/DL (ref 6–8.5)
PSA SERPL-MCNC: 1.15 NG/ML (ref 0–4)
RBC # BLD AUTO: 4.99 10*6/MM3 (ref 4.14–5.8)
SODIUM SERPL-SCNC: 139 MMOL/L (ref 136–145)
TRIGL SERPL-MCNC: 115 MG/DL (ref 0–150)
VLDLC SERPL-MCNC: 21 MG/DL (ref 5–40)
WBC NRBC COR # BLD: 4.78 10*3/MM3 (ref 3.4–10.8)

## 2023-01-16 PROCEDURE — 99396 PREV VISIT EST AGE 40-64: CPT | Performed by: INTERNAL MEDICINE

## 2023-01-16 PROCEDURE — G0103 PSA SCREENING: HCPCS

## 2023-01-16 PROCEDURE — 83036 HEMOGLOBIN GLYCOSYLATED A1C: CPT

## 2023-01-16 PROCEDURE — 80053 COMPREHEN METABOLIC PANEL: CPT

## 2023-01-16 PROCEDURE — 80061 LIPID PANEL: CPT

## 2023-01-16 PROCEDURE — 85027 COMPLETE CBC AUTOMATED: CPT

## 2023-01-16 PROCEDURE — 36415 COLL VENOUS BLD VENIPUNCTURE: CPT
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
## 2023-02-06 ENCOUNTER — TELEPHONE (OUTPATIENT)
Dept: GASTROENTEROLOGY | Facility: CLINIC | Age: 63
End: 2023-02-06
Payer: COMMERCIAL

## 2023-02-06 NOTE — TELEPHONE ENCOUNTER
Letter sent via my chart and printed for mail to discuss fast track option for colonoscopy screening    Please verify if any family history of colon polyps or colon cancer present as well as if Bruce Mistry has undergone previous colonoscopy

## 2023-03-06 DIAGNOSIS — Z86.010 HISTORY OF COLON POLYPS: Primary | ICD-10-CM

## 2023-03-06 RX ORDER — SODIUM, POTASSIUM,MAG SULFATES 17.5-3.13G
177 SOLUTION, RECONSTITUTED, ORAL ORAL TAKE AS DIRECTED
Qty: 177 ML | Refills: 0 | Status: SHIPPED | OUTPATIENT
Start: 2023-03-06

## 2023-03-08 PROBLEM — Z86.010 HISTORY OF COLON POLYPS: Status: ACTIVE | Noted: 2023-03-08

## 2023-03-08 PROBLEM — Z86.0100 HISTORY OF COLON POLYPS: Status: ACTIVE | Noted: 2023-03-08

## 2023-03-21 ENCOUNTER — HOSPITAL ENCOUNTER (OUTPATIENT)
Facility: HOSPITAL | Age: 63
Setting detail: HOSPITAL OUTPATIENT SURGERY
Discharge: HOME | End: 2023-03-21
Attending: INTERNAL MEDICINE | Admitting: INTERNAL MEDICINE
Payer: COMMERCIAL

## 2023-03-21 ENCOUNTER — ANESTHESIA (OUTPATIENT)
Dept: GASTROENTEROLOGY | Facility: HOSPITAL | Age: 63
End: 2023-03-21
Payer: COMMERCIAL

## 2023-03-21 ENCOUNTER — TELEPHONE (OUTPATIENT)
Dept: GASTROENTEROLOGY | Facility: CLINIC | Age: 63
End: 2023-03-21
Payer: COMMERCIAL

## 2023-03-21 ENCOUNTER — ANESTHESIA EVENT (OUTPATIENT)
Dept: GASTROENTEROLOGY | Facility: HOSPITAL | Age: 63
End: 2023-03-21
Payer: COMMERCIAL

## 2023-03-21 VITALS
HEART RATE: 66 BPM | RESPIRATION RATE: 14 BRPM | TEMPERATURE: 98.5 F | HEIGHT: 67 IN | WEIGHT: 190 LBS | SYSTOLIC BLOOD PRESSURE: 125 MMHG | OXYGEN SATURATION: 95 % | BODY MASS INDEX: 29.82 KG/M2 | DIASTOLIC BLOOD PRESSURE: 62 MMHG

## 2023-03-21 DIAGNOSIS — Z86.010 HISTORY OF COLON POLYPS: ICD-10-CM

## 2023-03-21 PROCEDURE — 45378 DIAGNOSTIC COLONOSCOPY: CPT | Performed by: INTERNAL MEDICINE

## 2023-03-21 PROCEDURE — 25010000002 PROPOFOL 10 MG/ML EMULSION

## 2023-03-21 RX ORDER — SODIUM CHLORIDE 0.9 % (FLUSH) 0.9 %
10 SYRINGE (ML) INJECTION AS NEEDED
Status: DISCONTINUED | OUTPATIENT
Start: 2023-03-21 | End: 2023-03-21 | Stop reason: HOSPADM

## 2023-03-21 RX ORDER — LIDOCAINE HYDROCHLORIDE 20 MG/ML
INJECTION, SOLUTION EPIDURAL; INFILTRATION; INTRACAUDAL; PERINEURAL AS NEEDED
Status: DISCONTINUED | OUTPATIENT
Start: 2023-03-21 | End: 2023-03-21 | Stop reason: SURG

## 2023-03-21 RX ORDER — PROPOFOL 10 MG/ML
VIAL (ML) INTRAVENOUS AS NEEDED
Status: DISCONTINUED | OUTPATIENT
Start: 2023-03-21 | End: 2023-03-21 | Stop reason: SURG

## 2023-03-21 RX ORDER — LIDOCAINE HYDROCHLORIDE 10 MG/ML
0.5 INJECTION, SOLUTION EPIDURAL; INFILTRATION; INTRACAUDAL; PERINEURAL ONCE AS NEEDED
Status: DISCONTINUED | OUTPATIENT
Start: 2023-03-21 | End: 2023-03-21 | Stop reason: HOSPADM

## 2023-03-21 RX ORDER — SODIUM CHLORIDE 9 MG/ML
500 INJECTION, SOLUTION INTRAVENOUS CONTINUOUS PRN
Status: DISCONTINUED | OUTPATIENT
Start: 2023-03-21 | End: 2023-03-21 | Stop reason: HOSPADM

## 2023-03-21 RX ADMIN — PROPOFOL INJECTABLE EMULSION 350 MG: 10 INJECTION, EMULSION INTRAVENOUS at 08:23

## 2023-03-21 RX ADMIN — LIDOCAINE HYDROCHLORIDE 100 MG: 20 INJECTION, SOLUTION EPIDURAL; INFILTRATION; INTRACAUDAL; PERINEURAL at 08:23

## 2023-03-21 RX ADMIN — SODIUM CHLORIDE 500 ML: 9 INJECTION, SOLUTION INTRAVENOUS at 08:02

## 2023-03-21 NOTE — TELEPHONE ENCOUNTER
Due to the fact he was poorly prepped I offered another attempt at his c-scope exam  He politely declined and wanted to have another in 5 yrs

## 2023-03-21 NOTE — ANESTHESIA POSTPROCEDURE EVALUATION
Patient: Bruce Mistry    Procedure Summary     Date: 03/21/23 Room / Location: [facility] ENDOSCOPY 4 / [facility]    Anesthesia Start: 0819 Anesthesia Stop: 0836    Procedure: COLONOSCOPY WITH ANESTHESIA Diagnosis:       History of colon polyps      (History of colon polyps [Z86.010])    Surgeons: Mario Drummond DO Provider: Omari Garcia CRNA    Anesthesia Type: MAC ASA Status: 2          Anesthesia Type: MAC    Vitals  Vitals Value Taken Time   BP     Temp     Pulse 67 03/21/23 0836   Resp     SpO2 95 % 03/21/23 0836   Vitals shown include unvalidated device data.        Post Anesthesia Care and Evaluation    Patient location during evaluation: PHASE II  Patient participation: complete - patient participated  Level of consciousness: awake and alert  Pain score: 0    Airway patency: patent  Anesthetic complications: No anesthetic complications  PONV Status: none  Cardiovascular status: acceptable  Respiratory status: acceptable  Hydration status: acceptable  No anesthesia care post op

## 2023-03-21 NOTE — ANESTHESIA PREPROCEDURE EVALUATION
Anesthesia Evaluation     Patient summary reviewed   no history of anesthetic complications:  NPO Solid Status: > 8 hours             Airway   Mallampati: II  TM distance: >3 FB  No difficulty expected  Dental - normal exam     Pulmonary    (+) a smoker (cigars) Current, sleep apnea,   (-) asthma  Cardiovascular   Exercise tolerance: good (4-7 METS)    (+) hypertension, hyperlipidemia,   (-) past MI, dysrhythmias, cardiac stents      Neuro/Psych- negative ROS  GI/Hepatic/Renal/Endo - negative ROS     Musculoskeletal     Abdominal    Substance History      OB/GYN          Other                        Anesthesia Plan    ASA 2     MAC     intravenous induction     Anesthetic plan, risks, benefits, and alternatives have been provided, discussed and informed consent has been obtained with: patient.        CODE STATUS:

## 2023-03-21 NOTE — H&P
"[facility]  Pre Procedure History & Physical    Chief Complaint:   Polyps    Subjective     HPI:   Polyps    Past Medical History:   Past Medical History:   Diagnosis Date   • Arthritis    • Elevated cholesterol    • Hypertension    • Sleep apnea    • Urine troubles        Past Surgical History:  Past Surgical History:   Procedure Laterality Date   • ELBOW PROCEDURE Left 2021   • KNEE SURGERY Left 1975   • KNEE SURGERY Right 1981       Family History:  Family History   Problem Relation Age of Onset   • Cancer Mother    • Other Mother    • Heart disease Father    • Stroke Father        Social History:   reports that he has been smoking cigars. He does not have any smokeless tobacco history on file. He reports current alcohol use of about 3.0 standard drinks per week. He reports that he does not use drugs.    Medications:   Prior to Admission medications    Medication Sig Start Date End Date Taking? Authorizing Provider   hydroCHLOROthiazide (HYDRODIURIL) 25 MG tablet TAKE 1 TABLET BY MOUTH EVERY DAY 9/20/22  Yes RODRIGO Jj MD   simvastatin (ZOCOR) 20 MG tablet TAKE 1 TABLET BY MOUTH EVERY DAY 7/18/22  Yes RODRIGO Jj MD   sodium-potassium-magnesium sulfates (Suprep Bowel Prep Kit) 17.5-3.13-1.6 GM/177ML solution oral solution Take 1 bottle by mouth Take As Directed. Take as directed 3/6/23  Yes Jerry Connolly APRN   tamsulosin (FLOMAX) 0.4 MG capsule 24 hr capsule Take 1 capsule by mouth 2 (Two) Times a Day. 12/29/22  Yes Kelli Monroe APRN   cetirizine (zyrTEC) 10 MG tablet Take 1 tablet by mouth Daily. Taking 1 daily    Provider, MD Albert       Allergies:  Patient has no known allergies.    ROS:    General: Weight stable  Resp: No SOA  Cardiovascular: No CP    Objective     Blood pressure 162/94, pulse 82, temperature 98.5 °F (36.9 °C), temperature source Temporal, resp. rate 20, height 170.2 cm (67\"), weight 86.2 kg (190 lb), SpO2 98 %.    Physical Exam "   Constitutional: Pt is oriented to person, place, and in no distress.   HENT: Mouth/Throat: Oropharynx is clear.   Cardiovascular: Normal rate, regular rhythm.    Pulmonary/Chest: Effort normal. No respiratory distress. No  wheezes.   Abdominal: Soft. Non-distended.  Skin: Skin is warm and dry.   Psychiatric: Mood, memory, affect and judgment appear normal.     Assessment & Plan     Diagnosis:  Polyps    Anticipated Surgical Procedure:  C-scope    The risks, benefits, and alternatives of this procedure have been discussed with the patient or the responsible party- the patient understands and agrees to proceed.

## 2023-03-23 ENCOUNTER — TELEPHONE (OUTPATIENT)
Dept: GASTROENTEROLOGY | Facility: CLINIC | Age: 63
End: 2023-03-23
Payer: COMMERCIAL

## 2023-04-09 DIAGNOSIS — E78.5 HYPERLIPIDEMIA, UNSPECIFIED HYPERLIPIDEMIA TYPE: ICD-10-CM

## 2023-04-10 RX ORDER — SIMVASTATIN 20 MG
TABLET ORAL
Qty: 90 TABLET | Refills: 2 | Status: SHIPPED | OUTPATIENT
Start: 2023-04-10

## 2023-04-10 NOTE — TELEPHONE ENCOUNTER
Rx Refill Note  Requested Prescriptions     Pending Prescriptions Disp Refills   • simvastatin (ZOCOR) 20 MG tablet [Pharmacy Med Name: SIMVASTATIN 20 MG TABLET] 90 tablet 2     Sig: TAKE 1 TABLET BY MOUTH EVERY DAY      Last office visit with prescribing clinician: 1/16/2023   Last telemedicine visit with prescribing clinician: Visit date not found   Next office visit with prescribing clinician: 1/17/2024                         Would you like a call back once the refill request has been completed: [] Yes [] No    If the office needs to give you a call back, can they leave a voicemail: [] Yes [] No    REJI Ortiz  04/10/23, 08:52 CDT

## 2023-06-10 DIAGNOSIS — I10 ESSENTIAL HYPERTENSION: ICD-10-CM

## 2023-06-12 RX ORDER — HYDROCHLOROTHIAZIDE 25 MG/1
TABLET ORAL
Qty: 90 TABLET | Refills: 2 | Status: SHIPPED | OUTPATIENT
Start: 2023-06-12

## 2023-06-12 NOTE — TELEPHONE ENCOUNTER
Rx Refill Note  Requested Prescriptions     Pending Prescriptions Disp Refills   • hydroCHLOROthiazide (HYDRODIURIL) 25 MG tablet [Pharmacy Med Name: HYDROCHLOROTHIAZIDE 25 MG TAB] 90 tablet 2     Sig: TAKE 1 TABLET BY MOUTH EVERY DAY      Last office visit with prescribing clinician: 1/16/2023   Last telemedicine visit with prescribing clinician: Visit date not found   Next office visit with prescribing clinician: 1/17/2024                         Would you like a call back once the refill request has been completed: [] Yes [] No    If the office needs to give you a call back, can they leave a voicemail: [] Yes [] No    REJI Ortiz  06/12/23, 09:22 CDT      medication last filled 09/20/2022, qty 90, 2 refills.

## 2023-07-06 ENCOUNTER — OFFICE VISIT (OUTPATIENT)
Dept: NEUROLOGY | Age: 63
End: 2023-07-06
Payer: COMMERCIAL

## 2023-07-06 VITALS
HEART RATE: 63 BPM | HEIGHT: 67 IN | SYSTOLIC BLOOD PRESSURE: 120 MMHG | BODY MASS INDEX: 28.25 KG/M2 | WEIGHT: 180 LBS | OXYGEN SATURATION: 99 % | DIASTOLIC BLOOD PRESSURE: 80 MMHG

## 2023-07-06 DIAGNOSIS — G47.33 OBSTRUCTIVE SLEEP APNEA: Primary | ICD-10-CM

## 2023-07-06 DIAGNOSIS — Z99.89 CPAP (CONTINUOUS POSITIVE AIRWAY PRESSURE) DEPENDENCE: ICD-10-CM

## 2023-07-06 PROCEDURE — 99213 OFFICE O/P EST LOW 20 MIN: CPT | Performed by: PHYSICIAN ASSISTANT

## 2023-07-06 PROCEDURE — G8419 CALC BMI OUT NRM PARAM NOF/U: HCPCS | Performed by: PHYSICIAN ASSISTANT

## 2023-07-06 PROCEDURE — G8427 DOCREV CUR MEDS BY ELIG CLIN: HCPCS | Performed by: PHYSICIAN ASSISTANT

## 2023-07-06 PROCEDURE — 3017F COLORECTAL CA SCREEN DOC REV: CPT | Performed by: PHYSICIAN ASSISTANT

## 2023-07-06 PROCEDURE — 4004F PT TOBACCO SCREEN RCVD TLK: CPT | Performed by: PHYSICIAN ASSISTANT

## 2023-07-06 RX ORDER — SODIUM, POTASSIUM,MAG SULFATES 17.5-3.13G
1 SOLUTION, RECONSTITUTED, ORAL ORAL
COMMUNITY
Start: 2023-03-06

## 2023-07-06 NOTE — PROGRESS NOTES
[facility]  [address], [address] [facility] [address], [address] [place] [address]  Phone [phone]  Fax [phone]       [facility] Sleep Follow Up Encounter      Information:   Patient Name: Fco Fink  :   1960  Age:   58 y.o. MRN:   [id number]  Account #:  [de-identified]  Today:                23    Provider:  Cadence Turner PA-C    Chief Complaint   Patient presents with    Follow-up    Sleep Apnea    CPAP/BiPAP        Subjective:   Fco Fink is a 58 y.o. male  with a history of WILLIE who comes in for an annual sleep clinic follow up. He was diagnosed with WILLIE in  at [facility]. The PSG revealed an AHI of 30.2. He received orders for a replacement CPAP, 2022. The CPAP is set at a pressure range of 8cm to 16cm. The compliance report indicates that he is averaging 7.5 hours of PAP use per day. He uses his old device when he travels. He reports that consistent PAP use has alleviated the previous WILLIE symptoms. Objective:     Past Medical History:   Diagnosis Date    Arthritis     Hyperlipidemia     Hypertension     Sleep apnea        Past Surgical History:   Procedure Laterality Date    ARM SURGERY Left 2021    LEFT INSITU CUBITAL TUNNEL DECOMPRESSION performed by Donna Morales MD at [address] Bilateral     43 years ago       Recent Hospitalizations      Significant Injuries      No family history on file.     Social History  Social History     Tobacco Use   Smoking Status Every Day    Types: Cigarettes, Cigars   Smokeless Tobacco Never     Social History     Substance and Sexual Activity   Alcohol Use Never     Social History     Substance and Sexual Activity   Drug Use Never         Current Outpatient Medications   Medication Sig Dispense Refill    sodium-potassium-mag sulfate (SUPREP) 17.5-3.13-1.6 GM/177ML SOLN solution Take 1 Bottle by mouth      CPAP Machine MISC by Does not apply route      cetirizine (ZYRTEC) 10 MG

## 2023-07-06 NOTE — PATIENT INSTRUCTIONS
long-term follow-up should be established. Annual visits are reasonable, with more frequent visits in between if new issues arise. The purpose of long-term follow-up is to assess usage and monitor for recurrent WILLIE, new side effects, air leakage, and fluctuations in body weight. WHERE TO GET MORE INFORMATION -- Your healthcare provider is the best source of information for questions and concerns related to your medical problem. Organizations  [employer] Association  Provides information about sleep apnea to the public, publishes a newsletter, and serves as an advocate for people with the disorder. [address], [address] [facility]   [address], [address] [facility]   [email or web address]   [email or web address]   Tel: [phone]   Fax: [phone] [facility],[place] [address] organization that works to [employer] and safety by promoting public understanding of sleep and sleep disorders. Supports sleep-related education, research, and advocacy; produces and distributes educational materials to the public and healthcare professionals; and offers postdoctoral fellowships and grants for sleep researchers. [address] [place] [address]   [email or web address]   [email or web address]   Tel: [phone]   Fax: [phone]    Important information:  Medicare/private insurance CPAP/BiPAP/APAP requirements:  Medicare/private insurance has specific requirements for PAP compliance that must be met during the first 90 days of use to continue coverage for CPAP/BiPAP/APAP  from day 91 and beyond. The policy requires that patients use a PAP device 4 hours per 24 hour period, at least 70% of the time over a 30 day period. This data must be downloaded as a report direct from the PAP devices. This is called a compliance download. Your PAP supplier will assist you in this matter.      Note:  Where applicable, we will utilize PAP device efficiency

## 2023-08-04 ENCOUNTER — OFFICE VISIT (OUTPATIENT)
Dept: INTERNAL MEDICINE | Facility: CLINIC | Age: 63
End: 2023-08-04
Payer: COMMERCIAL

## 2023-08-04 ENCOUNTER — LAB (OUTPATIENT)
Dept: LAB | Facility: HOSPITAL | Age: 63
End: 2023-08-04
Payer: COMMERCIAL

## 2023-08-04 VITALS
BODY MASS INDEX: 28.56 KG/M2 | HEART RATE: 52 BPM | SYSTOLIC BLOOD PRESSURE: 124 MMHG | HEIGHT: 67 IN | DIASTOLIC BLOOD PRESSURE: 80 MMHG | WEIGHT: 182 LBS | OXYGEN SATURATION: 97 %

## 2023-08-04 DIAGNOSIS — R10.9 FLANK PAIN: ICD-10-CM

## 2023-08-04 DIAGNOSIS — M54.6 ACUTE LEFT-SIDED THORACIC BACK PAIN: Primary | ICD-10-CM

## 2023-08-04 LAB
BILIRUB UR QL STRIP: NEGATIVE
CLARITY UR: CLEAR
COLOR UR: YELLOW
GLUCOSE UR STRIP-MCNC: NEGATIVE MG/DL
HGB UR QL STRIP.AUTO: NEGATIVE
KETONES UR QL STRIP: NEGATIVE
LEUKOCYTE ESTERASE UR QL STRIP.AUTO: NEGATIVE
NITRITE UR QL STRIP: NEGATIVE
PH UR STRIP.AUTO: 7.5 [PH] (ref 5–8)
PROT UR QL STRIP: NEGATIVE
SP GR UR STRIP: 1.01 (ref 1–1.03)
UROBILINOGEN UR QL STRIP: NORMAL

## 2023-08-04 PROCEDURE — 81003 URINALYSIS AUTO W/O SCOPE: CPT

## 2023-08-04 PROCEDURE — 99213 OFFICE O/P EST LOW 20 MIN: CPT | Performed by: NURSE PRACTITIONER

## 2023-08-04 RX ORDER — TIZANIDINE 2 MG/1
2 TABLET ORAL NIGHTLY PRN
Qty: 30 TABLET | Refills: 1 | Status: SHIPPED | OUTPATIENT
Start: 2023-08-04

## 2023-09-05 RX ORDER — TAMSULOSIN HYDROCHLORIDE 0.4 MG/1
1 CAPSULE ORAL 2 TIMES DAILY
Qty: 180 CAPSULE | Refills: 2 | Status: SHIPPED | OUTPATIENT
Start: 2023-09-05

## 2023-09-05 NOTE — TELEPHONE ENCOUNTER
Rx Refill Note  Requested Prescriptions     Pending Prescriptions Disp Refills    tamsulosin (FLOMAX) 0.4 MG capsule 24 hr capsule 180 capsule 2     Sig: Take 1 capsule by mouth 2 (Two) Times a Day.      Last office visit with prescribing clinician: 1/16/2023   Last telemedicine visit with prescribing clinician: Visit date not found   Next office visit with prescribing clinician: 1/17/2024                         Would you like a call back once the refill request has been completed: [] Yes [] No    If the office needs to give you a call back, can they leave a voicemail: [] Yes [] No    Albin Rivera MA  09/05/23, 09:32 CDT

## 2023-12-19 ENCOUNTER — LAB (OUTPATIENT)
Dept: LAB | Facility: HOSPITAL | Age: 63
End: 2023-12-19
Payer: COMMERCIAL

## 2023-12-19 ENCOUNTER — TELEPHONE (OUTPATIENT)
Dept: INTERNAL MEDICINE | Facility: CLINIC | Age: 63
End: 2023-12-19
Payer: COMMERCIAL

## 2023-12-19 DIAGNOSIS — R31.0 GROSS HEMATURIA: Primary | ICD-10-CM

## 2023-12-19 DIAGNOSIS — R31.9 HEMATURIA, UNSPECIFIED TYPE: Primary | ICD-10-CM

## 2023-12-19 DIAGNOSIS — R31.0 GROSS HEMATURIA: ICD-10-CM

## 2023-12-19 LAB
BACTERIA UR QL AUTO: ABNORMAL /HPF
BILIRUB UR QL STRIP: NEGATIVE
CLARITY UR: CLEAR
COLOR UR: YELLOW
GLUCOSE UR STRIP-MCNC: NEGATIVE MG/DL
HGB UR QL STRIP.AUTO: ABNORMAL
HOLD SPECIMEN: NORMAL
HYALINE CASTS UR QL AUTO: ABNORMAL /LPF
KETONES UR QL STRIP: NEGATIVE
LEUKOCYTE ESTERASE UR QL STRIP.AUTO: NEGATIVE
NITRITE UR QL STRIP: NEGATIVE
PH UR STRIP.AUTO: 6 [PH] (ref 5–8)
PROT UR QL STRIP: NEGATIVE
RBC # UR STRIP: ABNORMAL /HPF
REF LAB TEST METHOD: ABNORMAL
SP GR UR STRIP: 1.01 (ref 1–1.03)
SQUAMOUS #/AREA URNS HPF: ABNORMAL /HPF
UROBILINOGEN UR QL STRIP: ABNORMAL
WBC # UR STRIP: ABNORMAL /HPF

## 2023-12-19 PROCEDURE — 81001 URINALYSIS AUTO W/SCOPE: CPT

## 2023-12-19 NOTE — TELEPHONE ENCOUNTER
Patient called the office stating he noticed some blood in urine over the last couple of days. Reports bright red blood, denies any other symptoms, and no pain. States he will be going out of the [place] on Thurs and will be gone for 10 days. Wondering what should he do.     Urinalysis with culture indicated pended to chart for review.

## 2023-12-19 NOTE — TELEPHONE ENCOUNTER
I have ordered a urinalysis for evaluation.  If it is consistent with urinary tract infection we will send over antibiotics.

## 2023-12-20 DIAGNOSIS — R31.0 GROSS HEMATURIA: Primary | ICD-10-CM

## 2023-12-21 NOTE — PROGRESS NOTES
Subjective    Mr. Mistry is 63 y.o. male    Chief Complaint: Gross Hematuria     History of Present Illness  Patient referred for painless gross hematuria.  Hematuria began 12/23 and lasted for 3 weeks.  Strenuous activity worsened symptoms. Hematuria terminal.  Urinalysis showed 21-50 RBCs with no bacteria.  Patient has not had imaging.  Patient maintained on Flomax for BPH symptoms. AUA 15/35 with incomplete emptying, frequency, intermittency, weak stream, nocturia X 1. Denies flank pain.     The following portions of the patient's history were reviewed and updated as appropriate: allergies, current medications, past family history, past medical history, past social history, past surgical history and problem list.    Review of Systems      Current Outpatient Medications:     cetirizine (zyrTEC) 10 MG tablet, Take 1 tablet by mouth Daily. Taking 1 daily, Disp: , Rfl:     hydroCHLOROthiazide (HYDRODIURIL) 25 MG tablet, Take 1 tablet by mouth Daily., Disp: 90 tablet, Rfl: 2    simvastatin (ZOCOR) 20 MG tablet, Take 1 tablet by mouth Daily., Disp: 90 tablet, Rfl: 2    tamsulosin (FLOMAX) 0.4 MG capsule 24 hr capsule, Take 1 capsule by mouth 2 (Two) Times a Day., Disp: 180 capsule, Rfl: 2    Past Medical History:   Diagnosis Date    Arthritis     Elevated cholesterol     Hypertension     Sleep apnea     Urine troubles        Past Surgical History:   Procedure Laterality Date    COLONOSCOPY N/A 3/21/2023    Procedure: COLONOSCOPY WITH ANESTHESIA;  Surgeon: Mario Drummond DO;  Location: [facility] ENDOSCOPY;  Service: Gastroenterology;  Laterality: N/A;  preop; hx of polyps  postop; poor prep  [facility]    ELBOW PROCEDURE Left 2021    KNEE SURGERY Left 1975    KNEE SURGERY Right 1981       Social History     Socioeconomic History    Marital status:    Tobacco Use    Smoking status: Light Smoker     Types: Cigars   Vaping Use    Vaping Use: Never used   Substance and Sexual Activity    Alcohol use: Yes     " Alcohol/week: 3.0 standard drinks of alcohol     Types: 3 Cans of beer per week    Drug use: Never    Sexual activity: Yes       Family History   Problem Relation Age of Onset    Cancer Mother     Other Mother     Heart disease Father     Stroke Father        Objective    Temp 96.7 °F (35.9 °C)   Ht 170.2 cm (67\")   Wt 85.7 kg (189 lb)   BMI 29.60 kg/m²     Physical Exam        Results for orders placed or performed in visit on 01/03/24   POC Urinalysis Dipstick, Multipro    Specimen: Urine   Result Value Ref Range    Color Yellow Yellow, Straw, Dark Yellow, Yesy    Clarity, UA Clear Clear    Glucose, UA Negative Negative mg/dL    Bilirubin Negative Negative    Ketones, UA 15 mg/dL (A) Negative    Specific Gravity  1.010 1.005 - 1.030    Blood, UA Trace (A) Negative    pH, Urine 6.0 5.0 - 8.0    Protein, POC Negative Negative mg/dL    Urobilinogen, UA 0.2 E.U./dL Normal, 0.2 E.U./dL    Nitrite, UA Negative Negative    Leukocytes Negative Negative     IPSS Questionnaire (AUA-7):  Incomplete emptying  Over the past month, how often have you had a sensation of not emptying your bladder completely after you finished urinating?: More than half the time (01/03/24 0925)  Frequency  Over the past month, how often have you had to urinate again less than two hours after you finishied urinating ?: About half the time (01/03/24 0925)  Intermittency  Over the past month, how often have you found you stopped and started again several time when you urinated ?: About half the time (01/03/24 0925)  Urgency  Over the last month, how often have you found it difficult  have you found it difficult to postpone urination ?: Not at all (01/03/24 0925)  Weak Stream  Over the past month, how often have you had a weak urinary stream ?: More than half the time (01/03/24 0925)  Straining  Over the past month, how often have you had to push or strain to begin urination ?: Not at all (01/03/24 0925)  Nocturia  Over the past month, how many " times did you most typically get up to urinate from the time you went to bed until the time you got up in the morning ?: 1 time (01/03/24 0925)  Quality of life due to urinary symptoms  If you were to spend the rest of your life with your urinary condition the way it is now, how would feel about that?: Mixed - about equally satisfied (01/03/24 0925)    Scores  Total IPSS Score: (!) 15 (01/03/24 0925)  Total Score = Symptomatic Level: Moderately symptomatic: 8-19 (01/03/24 0925)        Assessment and Plan    Diagnoses and all orders for this visit:    1. Gross hematuria (Primary)  -     POC Urinalysis Dipstick, Multipro  -     CT Abdomen Pelvis With & Without Contrast; Future  -     Cystoscopy; Future    2. BPH with urinary obstruction    Reviewed old records indicating urinalysis negative for infection.    Patient with painless gross hematuria after strenuous activity.  This resolved after 3 weeks.  Will plan for full hematuria evaluation with CT urogram and cystoscopy.  This represents a new diagnosis of uncertain prognosis.

## 2024-01-02 DIAGNOSIS — I10 ESSENTIAL HYPERTENSION: ICD-10-CM

## 2024-01-02 DIAGNOSIS — E78.5 HYPERLIPIDEMIA, UNSPECIFIED HYPERLIPIDEMIA TYPE: ICD-10-CM

## 2024-01-02 RX ORDER — SIMVASTATIN 20 MG
20 TABLET ORAL DAILY
Qty: 90 TABLET | Refills: 2 | Status: SHIPPED | OUTPATIENT
Start: 2024-01-02

## 2024-01-02 RX ORDER — HYDROCHLOROTHIAZIDE 25 MG/1
25 TABLET ORAL DAILY
Qty: 90 TABLET | Refills: 2 | Status: SHIPPED | OUTPATIENT
Start: 2024-01-02

## 2024-01-03 ENCOUNTER — OFFICE VISIT (OUTPATIENT)
Dept: UROLOGY | Facility: CLINIC | Age: 64
End: 2024-01-03
Payer: COMMERCIAL

## 2024-01-03 VITALS — HEIGHT: 67 IN | TEMPERATURE: 96.7 F | BODY MASS INDEX: 29.66 KG/M2 | WEIGHT: 189 LBS

## 2024-01-03 DIAGNOSIS — N40.1 BPH WITH URINARY OBSTRUCTION: ICD-10-CM

## 2024-01-03 DIAGNOSIS — R31.0 GROSS HEMATURIA: Primary | ICD-10-CM

## 2024-01-03 DIAGNOSIS — N13.8 BPH WITH URINARY OBSTRUCTION: ICD-10-CM

## 2024-01-03 LAB
BILIRUB BLD-MCNC: NEGATIVE MG/DL
CLARITY, POC: CLEAR
COLOR UR: YELLOW
GLUCOSE UR STRIP-MCNC: NEGATIVE MG/DL
KETONES UR QL: ABNORMAL
LEUKOCYTE EST, POC: NEGATIVE
NITRITE UR-MCNC: NEGATIVE MG/ML
PH UR: 6 [PH] (ref 5–8)
PROT UR STRIP-MCNC: NEGATIVE MG/DL
RBC # UR STRIP: ABNORMAL /UL
SP GR UR: 1.01 (ref 1–1.03)
UROBILINOGEN UR QL: ABNORMAL

## 2024-01-03 PROCEDURE — 99204 OFFICE O/P NEW MOD 45 MIN: CPT | Performed by: UROLOGY

## 2024-01-03 PROCEDURE — 81003 URINALYSIS AUTO W/O SCOPE: CPT | Performed by: UROLOGY

## 2024-01-17 ENCOUNTER — LAB (OUTPATIENT)
Dept: LAB | Facility: HOSPITAL | Age: 64
End: 2024-01-17
Payer: COMMERCIAL

## 2024-01-17 ENCOUNTER — OFFICE VISIT (OUTPATIENT)
Dept: INTERNAL MEDICINE | Facility: CLINIC | Age: 64
End: 2024-01-17
Payer: COMMERCIAL

## 2024-01-17 VITALS
HEART RATE: 58 BPM | HEIGHT: 67 IN | SYSTOLIC BLOOD PRESSURE: 130 MMHG | DIASTOLIC BLOOD PRESSURE: 80 MMHG | OXYGEN SATURATION: 95 % | BODY MASS INDEX: 28.44 KG/M2 | TEMPERATURE: 97.6 F | WEIGHT: 181.2 LBS

## 2024-01-17 DIAGNOSIS — R31.0 GROSS HEMATURIA: ICD-10-CM

## 2024-01-17 DIAGNOSIS — I10 ESSENTIAL HYPERTENSION: ICD-10-CM

## 2024-01-17 DIAGNOSIS — E66.3 OVERWEIGHT (BMI 25.0-29.9): ICD-10-CM

## 2024-01-17 DIAGNOSIS — Z12.5 PROSTATE CANCER SCREENING: ICD-10-CM

## 2024-01-17 DIAGNOSIS — Z00.00 ENCOUNTER FOR PREVENTIVE CARE: ICD-10-CM

## 2024-01-17 DIAGNOSIS — E78.5 HYPERLIPIDEMIA, UNSPECIFIED HYPERLIPIDEMIA TYPE: ICD-10-CM

## 2024-01-17 DIAGNOSIS — Z13.31 DEPRESSION SCREEN: ICD-10-CM

## 2024-01-17 DIAGNOSIS — Z00.00 ENCOUNTER FOR PREVENTIVE CARE: Primary | ICD-10-CM

## 2024-01-17 DIAGNOSIS — Z72.0 TOBACCO USE: ICD-10-CM

## 2024-01-17 LAB
ALBUMIN SERPL-MCNC: 4.9 G/DL (ref 3.5–5.2)
ALBUMIN/GLOB SERPL: 1.8 G/DL
ALP SERPL-CCNC: 51 U/L (ref 39–117)
ALT SERPL W P-5'-P-CCNC: 17 U/L (ref 1–41)
ANION GAP SERPL CALCULATED.3IONS-SCNC: 12 MMOL/L (ref 5–15)
AST SERPL-CCNC: 19 U/L (ref 1–40)
BILIRUB SERPL-MCNC: 0.7 MG/DL (ref 0–1.2)
BUN SERPL-MCNC: 13 MG/DL (ref 8–23)
BUN/CREAT SERPL: 16.3 (ref 7–25)
CALCIUM SPEC-SCNC: 9.4 MG/DL (ref 8.6–10.5)
CHLORIDE SERPL-SCNC: 99 MMOL/L (ref 98–107)
CHOLEST SERPL-MCNC: 132 MG/DL (ref 0–200)
CO2 SERPL-SCNC: 28 MMOL/L (ref 22–29)
CREAT SERPL-MCNC: 0.8 MG/DL (ref 0.76–1.27)
DEPRECATED RDW RBC AUTO: 39.5 FL (ref 37–54)
EGFRCR SERPLBLD CKD-EPI 2021: 98.8 ML/MIN/1.73
ERYTHROCYTE [DISTWIDTH] IN BLOOD BY AUTOMATED COUNT: 11.9 % (ref 12.3–15.4)
GLOBULIN UR ELPH-MCNC: 2.7 GM/DL
GLUCOSE SERPL-MCNC: 93 MG/DL (ref 65–99)
HBA1C MFR BLD: 5.2 % (ref 4.8–5.6)
HCT VFR BLD AUTO: 45.2 % (ref 37.5–51)
HDLC SERPL-MCNC: 54 MG/DL (ref 40–60)
HGB BLD-MCNC: 15.6 G/DL (ref 13–17.7)
LDLC SERPL CALC-MCNC: 65 MG/DL (ref 0–100)
LDLC/HDLC SERPL: 1.22 {RATIO}
MCH RBC QN AUTO: 31.1 PG (ref 26.6–33)
MCHC RBC AUTO-ENTMCNC: 34.5 G/DL (ref 31.5–35.7)
MCV RBC AUTO: 90.2 FL (ref 79–97)
PLATELET # BLD AUTO: 201 10*3/MM3 (ref 140–450)
PMV BLD AUTO: 9.6 FL (ref 6–12)
POTASSIUM SERPL-SCNC: 4 MMOL/L (ref 3.5–5.2)
PROT SERPL-MCNC: 7.6 G/DL (ref 6–8.5)
PSA SERPL-MCNC: 1.44 NG/ML (ref 0–4)
RBC # BLD AUTO: 5.01 10*6/MM3 (ref 4.14–5.8)
SODIUM SERPL-SCNC: 139 MMOL/L (ref 136–145)
TRIGL SERPL-MCNC: 61 MG/DL (ref 0–150)
VLDLC SERPL-MCNC: 13 MG/DL (ref 5–40)
WBC NRBC COR # BLD AUTO: 4.08 10*3/MM3 (ref 3.4–10.8)

## 2024-01-17 PROCEDURE — 80061 LIPID PANEL: CPT

## 2024-01-17 PROCEDURE — 85027 COMPLETE CBC AUTOMATED: CPT

## 2024-01-17 PROCEDURE — 83036 HEMOGLOBIN GLYCOSYLATED A1C: CPT

## 2024-01-17 PROCEDURE — 80053 COMPREHEN METABOLIC PANEL: CPT

## 2024-01-17 PROCEDURE — 36415 COLL VENOUS BLD VENIPUNCTURE: CPT

## 2024-01-17 PROCEDURE — G0103 PSA SCREENING: HCPCS

## 2024-01-17 NOTE — PATIENT INSTRUCTIONS
-Send me a [employer] message to me a week or 2 before your next visit, so I can order bloodwork for you.

## 2024-01-17 NOTE — PROGRESS NOTES
Chief Complaint   Patient presents with    Annual Exam         History:  Bruce Mistry is a 63 y.o. male who presents today for evaluation of the above problems.      Bruce presents today for his annual physical.    Overall, he is doing well.    However, he had gross hematuria and has seen Dr. Pace.  Hematuria has resolved at this point.  He has a CT scan set up for February 1 and office visit for cystoscopy on February 12 with Dr. Pace.    Continues to smoke cigars and is not ready to quit.    He is focusing on low carbohydrate diet to help lose weight.  He stays very active as well.      HPI    Social History     Socioeconomic History    Marital status:    Tobacco Use    Smoking status: Light Smoker     Types: Cigars    Smokeless tobacco: Never   Vaping Use    Vaping Use: Never used   Substance and Sexual Activity    Alcohol use: Yes     Alcohol/week: 3.0 standard drinks of alcohol     Types: 3 Cans of beer per week    Drug use: Never    Sexual activity: Yes       PHQ-9 Depression Screening  Little interest or pleasure in doing things? 0-->not at all   Feeling down, depressed, or hopeless? 0-->not at all   Trouble falling or staying asleep, or sleeping too much?     Feeling tired or having little energy?     Poor appetite or overeating?     Feeling bad about yourself - or that you are a failure or have let yourself or your family down?     Trouble concentrating on things, such as reading the newspaper or watching television?     Moving or speaking so slowly that other people could have noticed? Or the opposite - being so fidgety or restless that you have been moving around a lot more than usual?     Thoughts that you would be better off dead, or of hurting yourself in some way?     PHQ-9 Total Score 0   If you checked off any problems, how difficult have these problems made it for you to do your work, take care of things at home, or get along with other people?         PHQ-9 Total Score: 0    ROS:  Review  of Systems   Constitutional:  Negative for activity change, appetite change, fatigue, fever and unexpected weight change.   HENT:  Negative for nosebleeds, sore throat and trouble swallowing.    Eyes:  Negative for pain and visual disturbance.   Respiratory:  Negative for cough, chest tightness and shortness of breath.    Cardiovascular:  Negative for chest pain, palpitations and leg swelling.   Gastrointestinal:  Negative for abdominal pain, constipation, diarrhea, nausea and vomiting.   Endocrine: Negative for cold intolerance and heat intolerance.   Genitourinary:  Positive for hematuria.   Musculoskeletal: Negative.  Negative for back pain, neck pain and neck stiffness.   Skin:  Negative for rash and wound.   Neurological:  Negative for dizziness, syncope, weakness, light-headedness and headaches.   Hematological:  Negative for adenopathy. Does not bruise/bleed easily.   Psychiatric/Behavioral:  Negative for agitation, behavioral problems and confusion.          Current Outpatient Medications:     cetirizine (zyrTEC) 10 MG tablet, Take 1 tablet by mouth Daily. Taking 1 daily, Disp: , Rfl:     hydroCHLOROthiazide (HYDRODIURIL) 25 MG tablet, Take 1 tablet by mouth Daily., Disp: 90 tablet, Rfl: 2    simvastatin (ZOCOR) 20 MG tablet, Take 1 tablet by mouth Daily., Disp: 90 tablet, Rfl: 2    tamsulosin (FLOMAX) 0.4 MG capsule 24 hr capsule, Take 1 capsule by mouth 2 (Two) Times a Day., Disp: 180 capsule, Rfl: 2    Lab Results   Component Value Date    GLUCOSE 93 01/16/2023    BUN 15 01/16/2023    CREATININE 0.99 01/16/2023    EGFR 85.6 01/16/2023    BCR 15.2 01/16/2023    K 3.8 01/16/2023    CO2 33.0 (H) 01/16/2023    CALCIUM 9.5 01/16/2023    ALBUMIN 4.9 01/16/2023    BILITOT 0.8 01/16/2023    AST 17 01/16/2023    ALT 15 01/16/2023       WBC   Date Value Ref Range Status   01/16/2023 4.78 3.40 - 10.80 10*3/mm3 Final     RBC   Date Value Ref Range Status   01/16/2023 4.99 4.14 - 5.80 10*6/mm3 Final     Hemoglobin    Date Value Ref Range Status   01/16/2023 15.3 13.0 - 17.7 g/dL Final     Hematocrit   Date Value Ref Range Status   01/16/2023 46.5 37.5 - 51.0 % Final     MCV   Date Value Ref Range Status   01/16/2023 93.2 79.0 - 97.0 fL Final     MCH   Date Value Ref Range Status   01/16/2023 30.7 26.6 - 33.0 pg Final     MCHC   Date Value Ref Range Status   01/16/2023 32.9 31.5 - 35.7 g/dL Final     RDW   Date Value Ref Range Status   01/16/2023 11.9 (L) 12.3 - 15.4 % Final     RDW-SD   Date Value Ref Range Status   01/16/2023 41.1 37.0 - 54.0 fl Final     MPV   Date Value Ref Range Status   01/16/2023 9.1 6.0 - 12.0 fL Final     Platelets   Date Value Ref Range Status   01/16/2023 193 140 - 450 10*3/mm3 Final     Neutrophil %   Date Value Ref Range Status   11/24/2020 56.2 42.7 - 76.0 % Final     Lymphocyte %   Date Value Ref Range Status   11/24/2020 27.4 19.6 - 45.3 % Final     Monocyte %   Date Value Ref Range Status   11/24/2020 11.2 5.0 - 12.0 % Final     Eosinophil %   Date Value Ref Range Status   11/24/2020 3.7 0.3 - 6.2 % Final     Basophil %   Date Value Ref Range Status   11/24/2020 1.1 0.0 - 1.5 % Final     Immature Grans %   Date Value Ref Range Status   11/24/2020 0.4 0.0 - 0.5 % Final     Neutrophils, Absolute   Date Value Ref Range Status   11/24/2020 2.56 1.70 - 7.00 10*3/mm3 Final     Lymphocytes, Absolute   Date Value Ref Range Status   11/24/2020 1.25 0.70 - 3.10 10*3/mm3 Final     Monocytes, Absolute   Date Value Ref Range Status   11/24/2020 0.51 0.10 - 0.90 10*3/mm3 Final     Eosinophils, Absolute   Date Value Ref Range Status   11/24/2020 0.17 0.00 - 0.40 10*3/mm3 Final     Basophils, Absolute   Date Value Ref Range Status   11/24/2020 0.05 0.00 - 0.20 10*3/mm3 Final     Immature Grans, Absolute   Date Value Ref Range Status   11/24/2020 0.02 0.00 - 0.05 10*3/mm3 Final     nRBC   Date Value Ref Range Status   11/24/2020 0.0 0.0 - 0.2 /100 WBC Final         OBJECTIVE:  Visit Vitals  /80 (BP  "Location: Left arm, Patient Position: Sitting, Cuff Size: Adult)   Pulse 58   Temp 97.6 °F (36.4 °C) (Temporal)   Ht 170.2 cm (67\")   Wt 82.2 kg (181 lb 3.2 oz)   SpO2 95%   BMI 28.38 kg/m²      Physical Exam  Vitals and nursing note reviewed.   Constitutional:       General: He is not in acute distress.     Appearance: Normal appearance. He is well-developed. He is not ill-appearing.   HENT:      Head: Normocephalic and atraumatic.      Right Ear: Tympanic membrane, ear canal and external ear normal. There is no impacted cerumen.      Left Ear: Tympanic membrane, ear canal and external ear normal. There is no impacted cerumen.      Mouth/Throat:      Pharynx: No oropharyngeal exudate.   Eyes:      General: No scleral icterus.     Conjunctiva/sclera: Conjunctivae normal.      Pupils: Pupils are equal, round, and reactive to light.   Neck:      Thyroid: No thyromegaly.      Vascular: No carotid bruit or JVD.   Cardiovascular:      Rate and Rhythm: Normal rate and regular rhythm.      Heart sounds: Normal heart sounds. No murmur heard.     No friction rub. No gallop.   Pulmonary:      Effort: Pulmonary effort is normal. No respiratory distress.      Breath sounds: Normal breath sounds. No stridor. No wheezing, rhonchi or rales.   Abdominal:      General: Bowel sounds are normal. There is no distension.      Palpations: Abdomen is soft.      Tenderness: There is no abdominal tenderness.   Skin:     General: Skin is warm and dry.      Coloration: Skin is not jaundiced.   Neurological:      Mental Status: He is alert.      Cranial Nerves: No cranial nerve deficit.   Psychiatric:         Mood and Affect: Mood normal.         Behavior: Behavior normal.         Thought Content: Thought content normal.         Judgment: Judgment normal.         Assessment/Plan    Diagnoses and all orders for this visit:    1. Encounter for preventive care (Primary)  -     CBC (No Diff); Future  -     Comprehensive Metabolic Panel; Future  -  "    Hemoglobin A1c; Future  -     Lipid Panel; Future    2. Depression screen    3. Prostate cancer screening  -     PSA Screen; Future    4. Hyperlipidemia, unspecified hyperlipidemia type  -     Comprehensive Metabolic Panel; Future  -     Lipid Panel; Future    5. Essential hypertension  -     Comprehensive Metabolic Panel; Future    6. Overweight (BMI 25.0-29.9)    7. Tobacco use    8. Gross hematuria      I have ordered some fasting blood work today.  Depression screen negative with a PHQ 2 of 0.  Hypertension is well-controlled at 130/80.  Will continue his current regimen.    Obtain PSA with his other labs today.    Keep visits already scheduled with Dr. Pace for evaluation of the gross hematuria.    He declines influenza vaccine today.    Counseling/Anticipatory Guidance Discussed: nutrition, physical activity, healthy weight, alcohol and drugs, mental health, immunizations, and screenings    BMI is >= 25 and <30. (Overweight) The following options were offered after discussion;: weight loss educational material (shared in after visit summary) and exercise counseling/recommendations    Bruce Mistry  reports that he has been smoking cigars. He has never used smokeless tobacco.. I have educated him on the risk of diseases from using tobacco products such as cancer.     I advised him to quit and he is not willing to quit.    I spent 3  minutes counseling the patient.      Return in about 1 year (around 1/17/2025) for Welcome to Medicare Wellness.    Patient was given instructions and counseling regarding his/her condition or for health maintenance advice. Please see specific information pulled into the AVS if appropriate.     ALMA ROSA Jj MD  07:59 CST  1/17/2024  Electronically signed

## 2024-02-01 ENCOUNTER — HOSPITAL ENCOUNTER (OUTPATIENT)
Dept: CT IMAGING | Facility: HOSPITAL | Age: 64
Discharge: HOME | End: 2024-02-01
Admitting: UROLOGY
Payer: MEDICARE

## 2024-02-01 DIAGNOSIS — R31.0 GROSS HEMATURIA: ICD-10-CM

## 2024-02-01 LAB — CREAT BLDA-MCNC: 0.9 MG/DL (ref 0.6–1.3)

## 2024-02-01 PROCEDURE — 82565 ASSAY OF CREATININE: CPT

## 2024-02-01 PROCEDURE — 74178 CT ABD&PLV WO CNTR FLWD CNTR: CPT

## 2024-02-01 PROCEDURE — 25510000001 IOPAMIDOL 61 % SOLUTION: Performed by: UROLOGY

## 2024-02-01 RX ADMIN — IOPAMIDOL 100 ML: 612 INJECTION, SOLUTION INTRAVENOUS at 09:51

## 2024-02-12 ENCOUNTER — PROCEDURE VISIT (OUTPATIENT)
Dept: UROLOGY | Facility: CLINIC | Age: 64
End: 2024-02-12
Payer: MEDICARE

## 2024-02-12 DIAGNOSIS — N40.1 BPH WITH URINARY OBSTRUCTION: ICD-10-CM

## 2024-02-12 DIAGNOSIS — N13.8 BPH WITH URINARY OBSTRUCTION: ICD-10-CM

## 2024-02-12 DIAGNOSIS — R31.0 GROSS HEMATURIA: Primary | ICD-10-CM

## 2024-02-12 LAB
BILIRUB BLD-MCNC: NEGATIVE MG/DL
CLARITY, POC: CLEAR
COLOR UR: YELLOW
GLUCOSE UR STRIP-MCNC: NEGATIVE MG/DL
KETONES UR QL: NEGATIVE
LEUKOCYTE EST, POC: NEGATIVE
NITRITE UR-MCNC: NEGATIVE MG/ML
PH UR: 6 [PH] (ref 5–8)
PROT UR STRIP-MCNC: NEGATIVE MG/DL
RBC # UR STRIP: ABNORMAL /UL
SP GR UR: 1.01 (ref 1–1.03)
UROBILINOGEN UR QL: ABNORMAL

## 2024-02-12 PROCEDURE — 52000 CYSTOURETHROSCOPY: CPT | Performed by: UROLOGY

## 2024-02-12 PROCEDURE — 81001 URINALYSIS AUTO W/SCOPE: CPT | Performed by: UROLOGY

## 2024-02-12 PROCEDURE — 99214 OFFICE O/P EST MOD 30 MIN: CPT | Performed by: UROLOGY

## 2024-02-12 RX ORDER — FINASTERIDE 5 MG/1
5 TABLET, FILM COATED ORAL DAILY
Qty: 90 TABLET | Refills: 3 | Status: SHIPPED | OUTPATIENT
Start: 2024-02-12

## 2024-03-29 RX ORDER — TAMSULOSIN HYDROCHLORIDE 0.4 MG/1
1 CAPSULE ORAL 2 TIMES DAILY
Qty: 180 CAPSULE | Refills: 2 | Status: SHIPPED | OUTPATIENT
Start: 2024-03-29

## 2024-06-06 ENCOUNTER — TELEPHONE (OUTPATIENT)
Dept: NEUROLOGY | Age: 64
End: 2024-06-06

## 2024-06-06 NOTE — TELEPHONE ENCOUNTER
Spoke with patient since I was unable to get call to go through to patients number. Spouse is aware of appointment time and date.

## 2024-09-18 ENCOUNTER — OFFICE VISIT (OUTPATIENT)
Dept: NEUROLOGY | Age: 64
End: 2024-09-18
Payer: COMMERCIAL

## 2024-09-18 VITALS
BODY MASS INDEX: 27.94 KG/M2 | OXYGEN SATURATION: 97 % | WEIGHT: 178 LBS | HEART RATE: 57 BPM | HEIGHT: 67 IN | DIASTOLIC BLOOD PRESSURE: 66 MMHG | SYSTOLIC BLOOD PRESSURE: 114 MMHG

## 2024-09-18 DIAGNOSIS — G47.33 OBSTRUCTIVE SLEEP APNEA: Primary | ICD-10-CM

## 2024-09-18 DIAGNOSIS — Z99.89 CPAP (CONTINUOUS POSITIVE AIRWAY PRESSURE) DEPENDENCE: ICD-10-CM

## 2024-09-18 PROCEDURE — 1123F ACP DISCUSS/DSCN MKR DOCD: CPT | Performed by: PHYSICIAN ASSISTANT

## 2024-09-18 PROCEDURE — 99213 OFFICE O/P EST LOW 20 MIN: CPT | Performed by: PHYSICIAN ASSISTANT

## 2024-09-18 PROCEDURE — 4004F PT TOBACCO SCREEN RCVD TLK: CPT | Performed by: PHYSICIAN ASSISTANT

## 2024-09-18 PROCEDURE — 3017F COLORECTAL CA SCREEN DOC REV: CPT | Performed by: PHYSICIAN ASSISTANT

## 2024-09-18 PROCEDURE — G8427 DOCREV CUR MEDS BY ELIG CLIN: HCPCS | Performed by: PHYSICIAN ASSISTANT

## 2024-09-18 PROCEDURE — G8419 CALC BMI OUT NRM PARAM NOF/U: HCPCS | Performed by: PHYSICIAN ASSISTANT

## 2024-09-23 ENCOUNTER — HOSPITAL ENCOUNTER (EMERGENCY)
Age: 64
Discharge: HOME | End: 2024-09-23
Payer: MEDICARE

## 2024-09-23 VITALS
DIASTOLIC BLOOD PRESSURE: 98 MMHG | WEIGHT: 180 LBS | TEMPERATURE: 97.2 F | HEART RATE: 70 BPM | RESPIRATION RATE: 18 BRPM | BODY MASS INDEX: 28.19 KG/M2 | SYSTOLIC BLOOD PRESSURE: 151 MMHG | OXYGEN SATURATION: 98 %

## 2024-09-23 DIAGNOSIS — S61.011A LACERATION OF RIGHT THUMB WITHOUT FOREIGN BODY WITHOUT DAMAGE TO NAIL, INITIAL ENCOUNTER: Primary | ICD-10-CM

## 2024-09-23 PROCEDURE — 99283 EMERGENCY DEPT VISIT LOW MDM: CPT

## 2024-09-23 RX ORDER — CEPHALEXIN 500 MG/1
500 CAPSULE ORAL 2 TIMES DAILY
Qty: 14 CAPSULE | Refills: 0 | Status: SHIPPED | OUTPATIENT
Start: 2024-09-23 | End: 2024-09-30

## 2024-09-23 ASSESSMENT — ENCOUNTER SYMPTOMS: RESPIRATORY NEGATIVE: 1

## 2024-09-26 DIAGNOSIS — I10 ESSENTIAL HYPERTENSION: ICD-10-CM

## 2024-09-26 DIAGNOSIS — E78.5 HYPERLIPIDEMIA, UNSPECIFIED HYPERLIPIDEMIA TYPE: ICD-10-CM

## 2024-09-26 RX ORDER — SIMVASTATIN 20 MG
20 TABLET ORAL DAILY
Qty: 90 TABLET | Refills: 3 | Status: SHIPPED | OUTPATIENT
Start: 2024-09-26

## 2024-09-26 RX ORDER — HYDROCHLOROTHIAZIDE 25 MG/1
25 TABLET ORAL DAILY
Qty: 90 TABLET | Refills: 3 | Status: SHIPPED | OUTPATIENT
Start: 2024-09-26

## 2024-09-30 RX ORDER — TAMSULOSIN HYDROCHLORIDE 0.4 MG/1
1 CAPSULE ORAL 2 TIMES DAILY
Qty: 180 CAPSULE | Refills: 2 | Status: SHIPPED | OUTPATIENT
Start: 2024-09-30

## 2024-09-30 NOTE — TELEPHONE ENCOUNTER
Rx Refill Note  Requested Prescriptions     Pending Prescriptions Disp Refills    tamsulosin (FLOMAX) 0.4 MG capsule 24 hr capsule [Pharmacy Med Name: TAMSULOSIN HCL 0.4 MG CAPSULE] 180 capsule 2     Sig: TAKE 1 CAPSULE BY MOUTH TWICE A DAY      Last office visit with prescribing clinician: Visit date not found   Last telemedicine visit with prescribing clinician: Visit date not found   Next office visit with prescribing clinician: Visit date not found                         Would you like a call back once the refill request has been completed: [] Yes [] No    If the office needs to give you a call back, can they leave a voicemail: [] Yes [] No    Albin Rivera MA  09/30/24, 09:00 CDT

## 2025-01-09 ENCOUNTER — TELEPHONE (OUTPATIENT)
Dept: INTERNAL MEDICINE | Facility: CLINIC | Age: 65
End: 2025-01-09
Payer: COMMERCIAL

## 2025-01-09 DIAGNOSIS — Z12.5 PROSTATE CANCER SCREENING: ICD-10-CM

## 2025-01-09 DIAGNOSIS — E78.5 HYPERLIPIDEMIA, UNSPECIFIED HYPERLIPIDEMIA TYPE: ICD-10-CM

## 2025-01-09 DIAGNOSIS — I10 ESSENTIAL HYPERTENSION: ICD-10-CM

## 2025-01-09 DIAGNOSIS — Z00.00 ENCOUNTER FOR PREVENTIVE CARE: ICD-10-CM

## 2025-01-09 NOTE — TELEPHONE ENCOUNTER
PATIENT HAS CALLED,  HE IS ASKING THAT HIS LABS BE PUT IN SO THE RESULTS COULD BE DISCUSSED AT HIS APPOINTMENT ON 01/20/2025

## 2025-01-14 ENCOUNTER — LAB (OUTPATIENT)
Dept: LAB | Facility: HOSPITAL | Age: 65
End: 2025-01-14
Payer: MEDICARE

## 2025-01-14 DIAGNOSIS — Z00.00 ENCOUNTER FOR PREVENTIVE CARE: ICD-10-CM

## 2025-01-14 DIAGNOSIS — I10 ESSENTIAL HYPERTENSION: ICD-10-CM

## 2025-01-14 DIAGNOSIS — Z12.5 PROSTATE CANCER SCREENING: ICD-10-CM

## 2025-01-14 DIAGNOSIS — E78.5 HYPERLIPIDEMIA, UNSPECIFIED HYPERLIPIDEMIA TYPE: ICD-10-CM

## 2025-01-14 LAB
ALBUMIN SERPL-MCNC: 4.5 G/DL (ref 3.5–5.2)
ALBUMIN/GLOB SERPL: 1.8 G/DL
ALP SERPL-CCNC: 59 U/L (ref 39–117)
ALT SERPL W P-5'-P-CCNC: 22 U/L (ref 1–41)
ANION GAP SERPL CALCULATED.3IONS-SCNC: 12 MMOL/L (ref 5–15)
AST SERPL-CCNC: 22 U/L (ref 1–40)
BILIRUB SERPL-MCNC: 0.6 MG/DL (ref 0–1.2)
BUN SERPL-MCNC: 15 MG/DL (ref 8–23)
BUN/CREAT SERPL: 18.1 (ref 7–25)
CALCIUM SPEC-SCNC: 9.4 MG/DL (ref 8.6–10.5)
CHLORIDE SERPL-SCNC: 100 MMOL/L (ref 98–107)
CHOLEST SERPL-MCNC: 174 MG/DL (ref 0–200)
CO2 SERPL-SCNC: 28 MMOL/L (ref 22–29)
CREAT SERPL-MCNC: 0.83 MG/DL (ref 0.76–1.27)
DEPRECATED RDW RBC AUTO: 41 FL (ref 37–54)
EGFRCR SERPLBLD CKD-EPI 2021: 97.1 ML/MIN/1.73
ERYTHROCYTE [DISTWIDTH] IN BLOOD BY AUTOMATED COUNT: 12.1 % (ref 12.3–15.4)
GLOBULIN UR ELPH-MCNC: 2.5 GM/DL
GLUCOSE SERPL-MCNC: 97 MG/DL (ref 65–99)
HBA1C MFR BLD: 5.2 % (ref 4.8–5.6)
HCT VFR BLD AUTO: 43.2 % (ref 37.5–51)
HDLC SERPL-MCNC: 45 MG/DL (ref 40–60)
HGB BLD-MCNC: 14.7 G/DL (ref 13–17.7)
LDLC SERPL CALC-MCNC: 99 MG/DL (ref 0–100)
LDLC/HDLC SERPL: 2.11 {RATIO}
MCH RBC QN AUTO: 31.4 PG (ref 26.6–33)
MCHC RBC AUTO-ENTMCNC: 34 G/DL (ref 31.5–35.7)
MCV RBC AUTO: 92.3 FL (ref 79–97)
PLATELET # BLD AUTO: 168 10*3/MM3 (ref 140–450)
PMV BLD AUTO: 9.5 FL (ref 6–12)
POTASSIUM SERPL-SCNC: 3.8 MMOL/L (ref 3.5–5.2)
PROT SERPL-MCNC: 7 G/DL (ref 6–8.5)
PSA SERPL-MCNC: 0.27 NG/ML (ref 0–4)
RBC # BLD AUTO: 4.68 10*6/MM3 (ref 4.14–5.8)
SODIUM SERPL-SCNC: 140 MMOL/L (ref 136–145)
TRIGL SERPL-MCNC: 170 MG/DL (ref 0–150)
VLDLC SERPL-MCNC: 30 MG/DL (ref 5–40)
WBC NRBC COR # BLD AUTO: 5.28 10*3/MM3 (ref 3.4–10.8)

## 2025-01-14 PROCEDURE — 80061 LIPID PANEL: CPT

## 2025-01-14 PROCEDURE — 85027 COMPLETE CBC AUTOMATED: CPT

## 2025-01-14 PROCEDURE — 80053 COMPREHEN METABOLIC PANEL: CPT

## 2025-01-14 PROCEDURE — 83036 HEMOGLOBIN GLYCOSYLATED A1C: CPT

## 2025-01-14 PROCEDURE — G0103 PSA SCREENING: HCPCS

## 2025-01-14 PROCEDURE — 36415 COLL VENOUS BLD VENIPUNCTURE: CPT

## 2025-01-20 ENCOUNTER — OFFICE VISIT (OUTPATIENT)
Dept: INTERNAL MEDICINE | Facility: CLINIC | Age: 65
End: 2025-01-20
Payer: MEDICARE

## 2025-01-20 VITALS
DIASTOLIC BLOOD PRESSURE: 80 MMHG | OXYGEN SATURATION: 94 % | HEIGHT: 67 IN | BODY MASS INDEX: 31.04 KG/M2 | HEART RATE: 70 BPM | TEMPERATURE: 97.3 F | WEIGHT: 197.8 LBS | SYSTOLIC BLOOD PRESSURE: 126 MMHG

## 2025-01-20 DIAGNOSIS — I10 ESSENTIAL HYPERTENSION: ICD-10-CM

## 2025-01-20 DIAGNOSIS — Z13.31 DEPRESSION SCREEN: ICD-10-CM

## 2025-01-20 DIAGNOSIS — Z91.81 AT MODERATE RISK FOR FALL: ICD-10-CM

## 2025-01-20 DIAGNOSIS — E78.5 HYPERLIPIDEMIA, UNSPECIFIED HYPERLIPIDEMIA TYPE: ICD-10-CM

## 2025-01-20 DIAGNOSIS — Z00.00 WELCOME TO MEDICARE PREVENTIVE VISIT: Primary | ICD-10-CM

## 2025-01-20 DIAGNOSIS — E66.811 CLASS 1 OBESITY WITH SERIOUS COMORBIDITY AND BODY MASS INDEX (BMI) OF 30.0 TO 30.9 IN ADULT, UNSPECIFIED OBESITY TYPE: ICD-10-CM

## 2025-01-20 PROCEDURE — 3074F SYST BP LT 130 MM HG: CPT | Performed by: INTERNAL MEDICINE

## 2025-01-20 PROCEDURE — 3079F DIAST BP 80-89 MM HG: CPT | Performed by: INTERNAL MEDICINE

## 2025-01-20 PROCEDURE — 1125F AMNT PAIN NOTED PAIN PRSNT: CPT | Performed by: INTERNAL MEDICINE

## 2025-01-20 PROCEDURE — G0402 INITIAL PREVENTIVE EXAM: HCPCS | Performed by: INTERNAL MEDICINE

## 2025-01-20 NOTE — PROGRESS NOTES
Subjective   The ABCs of the Annual Wellness Visit  Medicare Wellness Visit      Bruce Mistry is a 64 y.o. patient who presents for a Medicare Wellness Visit.    Bruce is doing very well overall.  He did experience some right foot pain after playing pickle ball.  It took about a month for the symptoms to go away.    He has not had any more hematuria.  Still has nocturia x 1 per night and remains on Flomax twice a day, and finasteride 5 mg daily.    September 23, 2024 he sustained a laceration of the right thumb which has healed.  Still experiencing numbness in the thumb, and is wondering if that would improve.    The following portions of the patient's history were reviewed and   updated as appropriate: allergies, current medications, past family history, past medical history, past social history, past surgical history, and problem list.    Compared to one year ago, the patient's physical   health is the same.  Compared to one year ago, the patient's mental   health is the same.    Recent Hospitalizations:  He was not admitted to the hospital during the last year.     Current Medical Providers:  Patient Care Team:  RODRIGO Jj MD as PCP - General (Internal Medicine)    Outpatient Medications Prior to Visit   Medication Sig Dispense Refill    cetirizine (zyrTEC) 10 MG tablet Take 1 tablet by mouth Daily. Taking 1 daily      finasteride (PROSCAR) 5 MG tablet Take 1 tablet by mouth Daily. 90 tablet 3    hydroCHLOROthiazide 25 MG tablet Take 1 tablet by mouth Daily. 90 tablet 3    simvastatin (ZOCOR) 20 MG tablet Take 1 tablet by mouth Daily. 90 tablet 3    tamsulosin (FLOMAX) 0.4 MG capsule 24 hr capsule Take 1 capsule by mouth 2 (Two) Times a Day. 180 capsule 2     No facility-administered medications prior to visit.     No opioid medication identified on active medication list. I have reviewed chart for other potential  high risk medication/s and harmful drug interactions in the elderly.      Aspirin is not  "on active medication list.  Aspirin use is not indicated based on review of current medical condition/s. Risk of harm outweighs potential benefits.  .    Patient Active Problem List   Diagnosis    Essential hypertension    Hyperlipidemia    IRIS on CPAP    BPH with obstruction/lower urinary tract symptoms    CPAP (continuous positive airway pressure) dependence    Cubital tunnel syndrome on left    History of colon polyps    Class 1 obesity with serious comorbidity and body mass index (BMI) of 30.0 to 30.9 in adult     Advance Care Planning Advance Directive is not on file.  ACP discussion was held with the patient during this visit. Patient has an advance directive (not in EMR), copy requested.            Objective   Vitals:    01/20/25 0752   BP: 126/80   BP Location: Left arm   Patient Position: Sitting   Cuff Size: Adult   Pulse: 70   Temp: 97.3 °F (36.3 °C)   TempSrc: Temporal   SpO2: 94%   Weight: 89.7 kg (197 lb 12.8 oz)   Height: 170.2 cm (67\")       Estimated body mass index is 30.98 kg/m² as calculated from the following:    Height as of this encounter: 170.2 cm (67\").    Weight as of this encounter: 89.7 kg (197 lb 12.8 oz).    BMI is >= 25 and <30. (Overweight) The following options were offered after discussion;: weight loss educational material (shared in after visit summary), exercise counseling/recommendations, and nutrition counseling/recommendations          Gait and Balance Evaluation:  Normal    Does the patient have evidence of cognitive impairment? No  Lab Results   Component Value Date    TRIG 170 (H) 01/14/2025    HDL 45 01/14/2025    LDL 99 01/14/2025    VLDL 30 01/14/2025    HGBA1C 5.20 01/14/2025                                                                                               Health  Risk Assessment    Smoking Status:  Social History     Tobacco Use   Smoking Status Some Days    Types: Cigars   Smokeless Tobacco Never     Alcohol Consumption:  Social History     Substance and " Sexual Activity   Alcohol Use Yes    Alcohol/week: 5.0 standard drinks of alcohol    Types: 5 Drinks containing 0.5 oz of alcohol per week       Fall Risk Screen  STEADI Fall Risk Assessment was completed, and patient is at MODERATE risk for falls. Assessment completed on:2025    Depression Screening   Little interest or pleasure in doing things? Not at all   Feeling down, depressed, or hopeless? Not at all   PHQ-2 Total Score 0      Health Habits and Functional and Cognitive Screenin/13/2025     8:32 AM   Functional & Cognitive Status   Do you have difficulty preparing food and eating? No    Do you have difficulty bathing yourself, getting dressed or grooming yourself? No    Do you have difficulty using the toilet? No    Do you have difficulty moving around from place to place? No    Do you have trouble with steps or getting out of a bed or a chair? No    Current Diet Other    Dental Exam Up to date    Eye Exam Up to date    Exercise (times per week) 5 times per week    Current Exercises Include Light Weights;Pickleball;Treadmill;Yard Work    Do you need help using the phone?  No    Are you deaf or do you have serious difficulty hearing?  No    Do you need help to go to places out of walking distance? No    Do you need help shopping? No    Do you need help preparing meals?  No    Do you need help with housework?  No    Do you need help with laundry? No    Do you need help taking your medications? No    Do you need help managing money? No    Do you ever drive or ride in a car without wearing a seat belt? No    Have you felt unusual stress, anger or loneliness in the last month? No    Who do you live with? Spouse    If you need help, do you have trouble finding someone available to you? No    Have you been bothered in the last four weeks by sexual problems? No    Do you have difficulty concentrating, remembering or making decisions? No        Patient-reported   Visual Acuity:  Vision Screening    Right  eye Left eye Both eyes   Without correction      With correction 20/20 20/20 20/20     Age-appropriate Screening Schedule:  Refer to the list below for future screening recommendations based on patient's age, sex and/or medical conditions. Orders for these recommended tests are listed in the plan section. The patient has been provided with a written plan.    Health Maintenance List  Health Maintenance   Topic Date Due    ANNUAL WELLNESS VISIT  Never done    INFLUENZA VACCINE  03/31/2025 (Originally 7/1/2024)    Pneumococcal Vaccine 65+ (1 of 2 - PCV) 01/20/2026 (Originally 2/26/1966)    LIPID PANEL  01/14/2026    BMI FOLLOWUP  01/20/2026    COLORECTAL CANCER SCREENING  03/21/2028    HEPATITIS C SCREENING  Completed    AAA SCREEN ONCE  Completed    COVID-19 Vaccine  Discontinued    TDAP/TD VACCINES  Discontinued    ZOSTER VACCINE  Discontinued                                                                                                                                                CMS Preventative Services Quick Reference  Risk Factors Identified During Encounter  Fall Risk-High or Moderate: Discussed Fall Prevention in the home  Immunizations Discussed/Encouraged: Influenza, Prevnar 20 (Pneumococcal 20-valent conjugate), and COVID19    The above risks/problems have been discussed with the patient.  Pertinent information has been shared with the patient in the After Visit Summary.  An After Visit Summary and PPPS were made available to the patient.    Follow Up:   Next Medicare Wellness visit to be scheduled in 1 year.     Assessment & Plan  Welcome to Medicare preventive visit         Depression screen         At moderate risk for fall         Essential hypertension           Hyperlipidemia, unspecified hyperlipidemia type            Class 1 obesity with serious comorbidity and body mass index (BMI) of 30.0 to 30.9 in adult, unspecified obesity type  Patient's (Body mass index is 30.98 kg/m².) indicates that they  are obese (BMI >30) with health conditions that include obstructive sleep apnea, hypertension, and dyslipidemias . Weight is worsening. BMI  is above average; BMI management plan is completed. We discussed portion control and increasing exercise.           Bruce Mistry  reports that he has been smoking cigars. He has never used smokeless tobacco. I have educated him on the risk of diseases from using tobacco products such as cancer, COPD, and heart disease.     I spent 3  minutes counseling the patient.             Follow Up:   Return in about 1 year (around 1/20/2026) for Initial Medicare Wellness.

## 2025-01-20 NOTE — ASSESSMENT & PLAN NOTE
Patient's (Body mass index is 30.98 kg/m².) indicates that they are obese (BMI >30) with health conditions that include obstructive sleep apnea, hypertension, and dyslipidemias . Weight is worsening. BMI  is above average; BMI management plan is completed. We discussed portion control and increasing exercise.

## 2025-02-02 DIAGNOSIS — R31.0 GROSS HEMATURIA: ICD-10-CM

## 2025-02-02 DIAGNOSIS — N40.1 BPH WITH URINARY OBSTRUCTION: ICD-10-CM

## 2025-02-02 DIAGNOSIS — N13.8 BPH WITH URINARY OBSTRUCTION: ICD-10-CM

## 2025-02-03 RX ORDER — FINASTERIDE 5 MG/1
5 TABLET, FILM COATED ORAL DAILY
Qty: 90 TABLET | Refills: 3 | Status: SHIPPED | OUTPATIENT
Start: 2025-02-03

## 2025-08-26 RX ORDER — TAMSULOSIN HYDROCHLORIDE 0.4 MG/1
1 CAPSULE ORAL 2 TIMES DAILY
Qty: 180 CAPSULE | Refills: 2 | Status: SHIPPED | OUTPATIENT
Start: 2025-08-26

## (undated) DEVICE — SUTURE ETHLN SZ 4-0 L18IN NONABSORBABLE BLK L19MM PS-2 3/8 1667H

## (undated) DEVICE — MASK ANES AD M SZ 5 PREM TAIL VLV INFL PRT UNSCENTED HK RNG

## (undated) DEVICE — GAUZE,SPONGE,FLUFF,6"X6.75",STRL,10/TRAY: Brand: MEDLINE

## (undated) DEVICE — Device: Brand: DEFENDO AIR/WATER/SUCTION AND BIOPSY VALVE

## (undated) DEVICE — DRESSING,GAUZE,XEROFORM,CURAD,5"X9",ST: Brand: CURAD

## (undated) DEVICE — AMBU AURAONCE U SIZE 4: Brand: AURAONCE

## (undated) DEVICE — DRAPE,U/ SHT,SPLIT,PLAS,STERIL: Brand: MEDLINE

## (undated) DEVICE — SENSR O2 OXIMAX FNGR A/ 18IN NONSTR

## (undated) DEVICE — CIRCUIT AD PLN SWVL WYE

## (undated) DEVICE — DRAPE,EXTREMITY,89X128,STERILE: Brand: MEDLINE

## (undated) DEVICE — SHEET,DRAPE,53X77,STERILE: Brand: MEDLINE

## (undated) DEVICE — MASK,OXYGEN,MED CONC,ADLT,7' TUB, UC: Brand: PENDING

## (undated) DEVICE — YANKAUER,BULB TIP WITH VENT: Brand: ARGYLE

## (undated) DEVICE — THE CHANNEL CLEANING BRUSH IS A NYLON FLEXI BRUSH ATTACHED TO A FLEXIBLE PLASTIC SHEATH DESIGNED TO SAFELY REMOVE DEBRIS FROM FLEXIBLE ENDOSCOPES.

## (undated) DEVICE — MAJOR BSIN SETUP PK

## (undated) DEVICE — BIPOLAR CORD: Brand: DEROYAL

## (undated) DEVICE — SUTURE ETHLN SZ 3-0 L18IN NONABSORBABLE BLK PS-2 L19MM 3/8 1669H